# Patient Record
Sex: FEMALE | Race: BLACK OR AFRICAN AMERICAN | NOT HISPANIC OR LATINO | Employment: OTHER | URBAN - METROPOLITAN AREA
[De-identification: names, ages, dates, MRNs, and addresses within clinical notes are randomized per-mention and may not be internally consistent; named-entity substitution may affect disease eponyms.]

---

## 2017-04-10 ENCOUNTER — HISTORICAL (OUTPATIENT)
Dept: ADMINISTRATIVE | Facility: HOSPITAL | Age: 62
End: 2017-04-10

## 2019-02-20 ENCOUNTER — HISTORICAL (OUTPATIENT)
Dept: RADIOLOGY | Facility: HOSPITAL | Age: 64
End: 2019-02-20

## 2019-04-01 ENCOUNTER — HOSPITAL ENCOUNTER (OUTPATIENT)
Dept: MEDSURG UNIT | Facility: HOSPITAL | Age: 64
End: 2019-04-05
Attending: INTERNAL MEDICINE | Admitting: INTERNAL MEDICINE

## 2019-04-01 LAB
ABS NEUT (OLG): 9.56 X10(3)/MCL (ref 2.1–9.2)
ALBUMIN SERPL-MCNC: 3.6 GM/DL (ref 3.4–5)
ALBUMIN/GLOB SERPL: 0.9 RATIO (ref 1.1–2)
ALP SERPL-CCNC: 121 UNIT/L (ref 38–126)
ALT SERPL-CCNC: 16 UNIT/L (ref 12–78)
AMPHET UR QL SCN: ABNORMAL
AMYLASE SERPL-CCNC: 44 UNIT/L (ref 25–115)
ANISOCYTOSIS BLD QL SMEAR: 2
APPEARANCE, UA: CLEAR
APTT PPP: 27.7 SECOND(S) (ref 24.2–33.9)
AST SERPL-CCNC: 10 UNIT/L (ref 15–37)
BACTERIA SPEC CULT: ABNORMAL /HPF
BARBITURATE SCN PRESENT UR: ABNORMAL
BASOPHILS # BLD AUTO: 0 X10(3)/MCL (ref 0–0.2)
BASOPHILS NFR BLD AUTO: 0 %
BENZODIAZ UR QL SCN: ABNORMAL
BILIRUB SERPL-MCNC: 0.3 MG/DL (ref 0.2–1)
BILIRUB UR QL STRIP: NEGATIVE
BILIRUBIN DIRECT+TOT PNL SERPL-MCNC: 0.1 MG/DL (ref 0–0.5)
BILIRUBIN DIRECT+TOT PNL SERPL-MCNC: 0.2 MG/DL (ref 0–0.8)
BUN SERPL-MCNC: 12 MG/DL (ref 7–18)
CALCIUM SERPL-MCNC: 9.3 MG/DL (ref 8.5–10.1)
CANNABINOIDS UR QL SCN: ABNORMAL
CHLORIDE SERPL-SCNC: 105 MMOL/L (ref 98–107)
CK MB SERPL-MCNC: 0.7 NG/ML (ref 0.5–3.6)
CK SERPL-CCNC: 56 UNIT/L (ref 26–192)
CO2 SERPL-SCNC: 30 MMOL/L (ref 21–32)
COCAINE UR QL SCN: ABNORMAL
COLOR UR: YELLOW
CREAT SERPL-MCNC: 0.88 MG/DL (ref 0.55–1.02)
CROSSMATCH INTERPRETATION: NORMAL
EOSINOPHIL # BLD AUTO: 0 X10(3)/MCL (ref 0–0.9)
EOSINOPHIL NFR BLD AUTO: 0 %
ERYTHROCYTE [DISTWIDTH] IN BLOOD BY AUTOMATED COUNT: 29 % (ref 11.5–17)
FERRITIN SERPL-MCNC: 4.2 NG/ML (ref 8–388)
GLOBULIN SER-MCNC: 4.1 GM/DL (ref 2.4–3.5)
GLUCOSE (UA): NEGATIVE
GLUCOSE SERPL-MCNC: 141 MG/DL (ref 74–106)
GROUP & RH: NORMAL
HCT VFR BLD AUTO: 29 % (ref 37–47)
HGB BLD-MCNC: 8.4 GM/DL (ref 12–16)
HGB UR QL STRIP: NEGATIVE
HYPOCHROMIA BLD QL SMEAR: 1
INR PPP: 1 (ref 0–1.3)
IRON SATN MFR SERPL: 4.7 % (ref 20–50)
IRON SERPL-MCNC: 21 MCG/DL (ref 50–175)
KETONES UR QL STRIP: NEGATIVE
LACTATE SERPL-SCNC: 2 MMOL/L (ref 0.4–2)
LEUKOCYTE ESTERASE UR QL STRIP: NEGATIVE
LIPASE SERPL-CCNC: 90 UNIT/L (ref 73–393)
LYMPHOCYTES # BLD AUTO: 1.3 X10(3)/MCL (ref 0.6–4.6)
LYMPHOCYTES NFR BLD AUTO: 12 % (ref 13–40)
MAGNESIUM SERPL-MCNC: 2 MG/DL (ref 1.8–2.4)
MCH RBC QN AUTO: 16.7 PG (ref 27–31)
MCHC RBC AUTO-ENTMCNC: 29 GM/DL (ref 33–36)
MCV RBC AUTO: 57.7 FL (ref 80–94)
MICROCYTES BLD QL SMEAR: 1
MONOCYTES # BLD AUTO: 0.7 X10(3)/MCL (ref 0.1–1.3)
MONOCYTES NFR BLD AUTO: 6 %
NEUTROPHILS # BLD AUTO: 9.56 X10(3)/MCL (ref 2.1–9.2)
NEUTROPHILS NFR BLD AUTO: 82 %
NITRITE UR QL STRIP: NEGATIVE
NRBC BLD AUTO-RTO: 0.2 % (ref 0–0.2)
OPIATES UR QL SCN: ABNORMAL
PCP UR QL: ABNORMAL
PH UR STRIP.AUTO: 8 [PH] (ref 5–7.5)
PH UR STRIP: 8 [PH] (ref 5–9)
PLATELET # BLD AUTO: 411 X10(3)/MCL (ref 130–400)
PLATELET # BLD EST: ABNORMAL 10*3/UL
PMV BLD AUTO: ABNORMAL FL (ref 7.4–10.4)
POIKILOCYTOSIS BLD QL SMEAR: 1
POLYCHROMASIA BLD QL SMEAR: 1
POTASSIUM SERPL-SCNC: 2.8 MMOL/L (ref 3.5–5.1)
PRODUCT READY: NORMAL
PROT SERPL-MCNC: 7.7 GM/DL (ref 6.4–8.2)
PROT UR QL STRIP: ABNORMAL
PROTHROMBIN TIME: 13.3 SECOND(S) (ref 12–14)
RBC # BLD AUTO: 5.03 X10(6)/MCL (ref 4.2–5.4)
RBC #/AREA URNS HPF: ABNORMAL /[HPF]
SCHISTOCYTES BLD QL AUTO: 1
SODIUM SERPL-SCNC: 140 MMOL/L (ref 136–145)
SP GR FLD REFRACTOMETRY: 1.01 (ref 1–1.03)
SP GR UR STRIP: 1.01 (ref 1–1.03)
SQUAMOUS EPITHELIAL, UA: ABNORMAL
TARGETS BLD QL SMEAR: 1
TIBC SERPL-MCNC: 449 MCG/DL (ref 250–450)
TRANSFERRIN SERPL-MCNC: 323 MG/DL (ref 200–360)
TRANSFUSION ORDER: NORMAL
TROPONIN I SERPL-MCNC: <0.02 NG/ML (ref 0.02–0.49)
TSH SERPL-ACNC: 1.53 MIU/L (ref 0.36–3.74)
UROBILINOGEN UR STRIP-ACNC: 0.2
WBC # SPEC AUTO: 11.7 X10(3)/MCL (ref 4.5–11.5)
WBC #/AREA URNS HPF: ABNORMAL /[HPF]

## 2019-04-02 LAB
ABS NEUT (OLG): 8.13 X10(3)/MCL (ref 2.1–9.2)
ABS NEUT (OLG): 8.7 X10(3)/MCL (ref 2.1–9.2)
ANISOCYTOSIS BLD QL SMEAR: 2
BASOPHILS # BLD AUTO: 0 X10(3)/MCL (ref 0–0.2)
BASOPHILS # BLD AUTO: 0 X10(3)/MCL (ref 0–0.2)
BASOPHILS NFR BLD AUTO: 0 %
BASOPHILS NFR BLD AUTO: 0 %
BUN SERPL-MCNC: 12 MG/DL (ref 7–18)
CALCIUM SERPL-MCNC: 8.9 MG/DL (ref 8.5–10.1)
CHLORIDE SERPL-SCNC: 108 MMOL/L (ref 98–107)
CO2 SERPL-SCNC: 28 MMOL/L (ref 21–32)
COLOR STL: ABNORMAL
CONSISTENCY STL: ABNORMAL
CREAT SERPL-MCNC: 0.85 MG/DL (ref 0.55–1.02)
CREAT/UREA NIT SERPL: 14.1
EOSINOPHIL # BLD AUTO: 0 X10(3)/MCL (ref 0–0.9)
EOSINOPHIL # BLD AUTO: 0 X10(3)/MCL (ref 0–0.9)
EOSINOPHIL NFR BLD AUTO: 0 %
EOSINOPHIL NFR BLD AUTO: 0 %
ERYTHROCYTE [DISTWIDTH] IN BLOOD BY AUTOMATED COUNT: 28.6 % (ref 11.5–17)
ERYTHROCYTE [DISTWIDTH] IN BLOOD BY AUTOMATED COUNT: 30.2 % (ref 11.5–17)
GLUCOSE SERPL-MCNC: 108 MG/DL (ref 74–106)
HCT VFR BLD AUTO: 26.6 % (ref 37–47)
HCT VFR BLD AUTO: 27.1 % (ref 37–47)
HEMOCCULT SP1 STL QL: POSITIVE
HGB BLD-MCNC: 7.7 GM/DL (ref 12–16)
HGB BLD-MCNC: 8 GM/DL (ref 12–16)
HYPOCHROMIA BLD QL SMEAR: 1
LYMPHOCYTES # BLD AUTO: 1.3 X10(3)/MCL (ref 0.6–4.6)
LYMPHOCYTES # BLD AUTO: 1.8 X10(3)/MCL (ref 0.6–4.6)
LYMPHOCYTES NFR BLD AUTO: 12 %
LYMPHOCYTES NFR BLD AUTO: 16 %
MAGNESIUM SERPL-MCNC: 1.9 MG/DL (ref 1.8–2.4)
MCH RBC QN AUTO: 16.6 PG (ref 27–31)
MCH RBC QN AUTO: 17.9 PG (ref 27–31)
MCHC RBC AUTO-ENTMCNC: 28.4 GM/DL (ref 33–36)
MCHC RBC AUTO-ENTMCNC: 30.1 GM/DL (ref 33–36)
MCV RBC AUTO: 58.4 FL (ref 80–94)
MCV RBC AUTO: 60.7 FL (ref 80–94)
MICROCYTES BLD QL SMEAR: 2
MONOCYTES # BLD AUTO: 0.7 X10(3)/MCL (ref 0.1–1.3)
MONOCYTES # BLD AUTO: 0.8 X10(3)/MCL (ref 0.1–1.3)
MONOCYTES NFR BLD AUTO: 6 %
MONOCYTES NFR BLD AUTO: 8 %
NEUTROPHILS # BLD AUTO: 8.13 X10(3)/MCL (ref 2.1–9.2)
NEUTROPHILS # BLD AUTO: 8.7 X10(3)/MCL (ref 2.1–9.2)
NEUTROPHILS NFR BLD AUTO: 75 %
NEUTROPHILS NFR BLD AUTO: 81 %
NRBC BLD AUTO-RTO: 0.2 % (ref 0–0.2)
PLATELET # BLD AUTO: 366 X10(3)/MCL (ref 130–400)
PLATELET # BLD AUTO: 409 X10(3)/MCL (ref 130–400)
PLATELET # BLD EST: NORMAL 10*3/UL
PMV BLD AUTO: ABNORMAL FL (ref 7.4–10.4)
PMV BLD AUTO: ABNORMAL FL (ref 7.4–10.4)
POIKILOCYTOSIS BLD QL SMEAR: 2
POTASSIUM SERPL-SCNC: 4.1 MMOL/L (ref 3.5–5.1)
RBC # BLD AUTO: 4.46 X10(6)/MCL (ref 4.2–5.4)
RBC # BLD AUTO: 4.64 X10(6)/MCL (ref 4.2–5.4)
RBC MORPH BLD: ABNORMAL
SODIUM SERPL-SCNC: 141 MMOL/L (ref 136–145)
TARGETS BLD QL SMEAR: 1
WBC # SPEC AUTO: 10.8 X10(3)/MCL (ref 4.5–11.5)
WBC # SPEC AUTO: 10.8 X10(3)/MCL (ref 4.5–11.5)

## 2019-04-03 LAB
ANISOCYTOSIS BLD QL SMEAR: 2
CRC RECOMMENDATION EXT: NORMAL
ERYTHROCYTE [DISTWIDTH] IN BLOOD BY AUTOMATED COUNT: 30.1 % (ref 11.5–17)
HCT VFR BLD AUTO: 25.1 % (ref 37–47)
HGB BLD-MCNC: 7.4 GM/DL (ref 12–16)
HYPOCHROMIA BLD QL SMEAR: 2
MCH RBC QN AUTO: 17.8 PG (ref 27–31)
MCHC RBC AUTO-ENTMCNC: 29.5 GM/DL (ref 33–36)
MCV RBC AUTO: 60.3 FL (ref 80–94)
MICROCYTES BLD QL SMEAR: 2
PLATELET # BLD AUTO: 330 X10(3)/MCL (ref 130–400)
PLATELET # BLD EST: NORMAL 10*3/UL
PMV BLD AUTO: ABNORMAL FL (ref 7.4–10.4)
POIKILOCYTOSIS BLD QL SMEAR: 2
RBC # BLD AUTO: 4.16 X10(6)/MCL (ref 4.2–5.4)
SCHISTOCYTES BLD QL AUTO: 1
TARGETS BLD QL SMEAR: 1
WBC # SPEC AUTO: 7.7 X10(3)/MCL (ref 4.5–11.5)

## 2019-04-04 LAB
ABS NEUT (OLG): 4.44 X10(3)/MCL (ref 2.1–9.2)
BASOPHILS # BLD AUTO: 0 X10(3)/MCL (ref 0–0.2)
BASOPHILS NFR BLD AUTO: 0 %
BUN SERPL-MCNC: 15 MG/DL (ref 7–18)
CALCIUM SERPL-MCNC: 8.4 MG/DL (ref 8.5–10.1)
CHLORIDE SERPL-SCNC: 103 MMOL/L (ref 98–107)
CO2 SERPL-SCNC: 28 MMOL/L (ref 21–32)
CREAT SERPL-MCNC: 0.89 MG/DL (ref 0.55–1.02)
CREAT/UREA NIT SERPL: 16.9
EOSINOPHIL # BLD AUTO: 0.1 X10(3)/MCL (ref 0–0.9)
EOSINOPHIL NFR BLD AUTO: 2 %
ERYTHROCYTE [DISTWIDTH] IN BLOOD BY AUTOMATED COUNT: 30.6 % (ref 11.5–17)
GLUCOSE SERPL-MCNC: 94 MG/DL (ref 74–106)
HCT VFR BLD AUTO: 24.7 % (ref 37–47)
HGB BLD-MCNC: 7.2 GM/DL (ref 12–16)
LYMPHOCYTES # BLD AUTO: 1.5 X10(3)/MCL (ref 0.6–4.6)
LYMPHOCYTES NFR BLD AUTO: 23 %
MCH RBC QN AUTO: 17.6 PG (ref 27–31)
MCHC RBC AUTO-ENTMCNC: 29.1 GM/DL (ref 33–36)
MCV RBC AUTO: 60.2 FL (ref 80–94)
MONOCYTES # BLD AUTO: 0.5 X10(3)/MCL (ref 0.1–1.3)
MONOCYTES NFR BLD AUTO: 8 %
NEUTROPHILS # BLD AUTO: 4.44 X10(3)/MCL (ref 2.1–9.2)
NEUTROPHILS NFR BLD AUTO: 67 %
PLATELET # BLD AUTO: 273 X10(3)/MCL (ref 130–400)
PMV BLD AUTO: ABNORMAL FL (ref 7.4–10.4)
POTASSIUM SERPL-SCNC: 3.5 MMOL/L (ref 3.5–5.1)
RBC # BLD AUTO: 4.1 X10(6)/MCL (ref 4.2–5.4)
SODIUM SERPL-SCNC: 140 MMOL/L (ref 136–145)
WBC # SPEC AUTO: 6.6 X10(3)/MCL (ref 4.5–11.5)

## 2019-04-05 LAB
BUN SERPL-MCNC: 13 MG/DL (ref 7–18)
CALCIUM SERPL-MCNC: 8.9 MG/DL (ref 8.5–10.1)
CHLORIDE SERPL-SCNC: 105 MMOL/L (ref 98–107)
CO2 SERPL-SCNC: 28 MMOL/L (ref 21–32)
CREAT SERPL-MCNC: 0.81 MG/DL (ref 0.55–1.02)
CREAT/UREA NIT SERPL: 16
ERYTHROCYTE [DISTWIDTH] IN BLOOD BY AUTOMATED COUNT: 30.8 % (ref 11.5–17)
GLUCOSE SERPL-MCNC: 90 MG/DL (ref 74–106)
HCT VFR BLD AUTO: 25.7 % (ref 37–47)
HGB BLD-MCNC: 7.5 GM/DL (ref 12–16)
MCH RBC QN AUTO: 17.6 PG (ref 27–31)
MCHC RBC AUTO-ENTMCNC: 29.2 GM/DL (ref 33–36)
MCV RBC AUTO: 60.2 FL (ref 80–94)
PLATELET # BLD AUTO: 339 X10(3)/MCL (ref 130–400)
PMV BLD AUTO: ABNORMAL FL (ref 7.4–10.4)
POTASSIUM SERPL-SCNC: 4.2 MMOL/L (ref 3.5–5.1)
RBC # BLD AUTO: 4.27 X10(6)/MCL (ref 4.2–5.4)
SODIUM SERPL-SCNC: 140 MMOL/L (ref 136–145)
WBC # SPEC AUTO: 6.8 X10(3)/MCL (ref 4.5–11.5)

## 2019-05-31 ENCOUNTER — HISTORICAL (OUTPATIENT)
Dept: INFUSION THERAPY | Facility: HOSPITAL | Age: 64
End: 2019-05-31

## 2019-06-14 ENCOUNTER — HISTORICAL (OUTPATIENT)
Dept: INFUSION THERAPY | Facility: HOSPITAL | Age: 64
End: 2019-06-14

## 2019-07-09 ENCOUNTER — HISTORICAL (OUTPATIENT)
Dept: INFUSION THERAPY | Facility: HOSPITAL | Age: 64
End: 2019-07-09

## 2019-08-09 ENCOUNTER — HISTORICAL (OUTPATIENT)
Dept: INFUSION THERAPY | Facility: HOSPITAL | Age: 64
End: 2019-08-09

## 2019-08-23 ENCOUNTER — HISTORICAL (OUTPATIENT)
Dept: RADIOLOGY | Facility: HOSPITAL | Age: 64
End: 2019-08-23

## 2019-09-12 ENCOUNTER — HISTORICAL (OUTPATIENT)
Dept: INFUSION THERAPY | Facility: HOSPITAL | Age: 64
End: 2019-09-12

## 2019-09-23 ENCOUNTER — HISTORICAL (OUTPATIENT)
Dept: INFUSION THERAPY | Facility: HOSPITAL | Age: 64
End: 2019-09-23

## 2019-10-07 ENCOUNTER — HISTORICAL (OUTPATIENT)
Dept: INFUSION THERAPY | Facility: HOSPITAL | Age: 64
End: 2019-10-07

## 2019-10-21 ENCOUNTER — HISTORICAL (OUTPATIENT)
Dept: INFUSION THERAPY | Facility: HOSPITAL | Age: 64
End: 2019-10-21

## 2021-06-25 ENCOUNTER — HISTORICAL (OUTPATIENT)
Dept: RADIOLOGY | Facility: HOSPITAL | Age: 66
End: 2021-06-25

## 2022-04-30 NOTE — CONSULTS
"   Patient:   Valery Winchester            MRN: [id number]            FIN: [id number]               Age:   63 years     Sex:  Female     :  1955   Associated Diagnoses:   None   Author:   Sharmin Covarrubias      Chief Complaint   We have been consulted by Dr. Raymundo to evaluate this patient for melena, anemia.      History of Present Illness   Ms. Winchester is a 63 year old AAF unknown to our group (known to Dr. Vinson).  She has a past medical history of reflux, retinal disease, PVD, hypertension, and asthma.  She presented to the ED complaining of dizziness that started this past Friday.  CT of the head is unremarkable.  She was hypertensive on arrival to the ED, and blood pressure has improved.    She also reported black stools over the past few days.  She reportedly has a history of iron deficiency anemia and was recently started on oral iron.  Though, she has not started taking it yet.  She denies taking Pepto-Bismol.  She began to have dark stool this past Friday.  At first her stools are hard and difficult to pass and then become soft.  She denies any hematochezia or hematemesis.  She does have some intermittent lower abdominal discomfort that is relieved with BMs.  She takes an 81 mg aspirin at home but otherwise denies anticoagulation.  She uses NSAIDs on occasion, occasionally Advil PM to help her sleep.  She does have a history of reflux and takes a PPI and Zantac at home.  Her reflux is controlled with dietary modifications as well.     Hemoglobin was down to 7.7 g/dL, and she was transfused 1 unit PRBC.  Hemoglobin is now 8 g/dL today.  Iron level noted to be low.  Stool is dark and Hemoccult positive.  She recalls having an EGD with Dr. Vinson about 3 years ago and reportedly had gastric polyps.  A colonoscopy about 3 years ago was "clear;" She wants to think the polyps were in her stomach and not in the colon.      Health Status   Allergies:    Allergic Reactions (All)  Severity Not " Documented  Lisinopril- Angio adema.  Canceled/Inactive Reactions (All)  No Known Allergies,    Allergies (1) Active Reaction  lisinopril angio adema   Current medications:  (Selected)   Inpatient Medications  Ordered  Protonix 40 mg Vial (IV Push): 40 mg, form: Injection, IV Slow, q12hr, Infuse over: 2 minute(s), first dose 19 20:27:00 CDT, STAT  Zofran 2 mg/mL injectable solution: 4 mg, form: Injection, IV Push, q4hr PRN for nausea, first dose 19 17:54:00 CDT, STAT  amLODIPine: 10 mg, form: Tab, Oral, Daily, first dose 19 9:00:00 CDT  cloNIDine: 0.1 mg, form: Tab, Oral, TID PRN for hypertension, first dose 19 21:18:00 CDT, STAT, SBP > 160, DBP > 90  hydrALAZINE (Apresoline) Inj.: 20 mg, form: Injection, IV Push, q4hr PRN for hypertension, first dose 19 20:23:00 CDT, STAT, If systolic blood pressure is greater than 180  labetalol 200 mg oral tablet: 200 mg, form: Tab, Oral, BID, first dose 19 21:00:00 CDT, STAT  labetalol 5 mg/mL intravenous solution: 20 mg, form: Soln, IV Push, q2hr PRN for hypertension, first dose 19 21:18:00 CDT, STAT, SBP > 160, DBP > 90  topiramate: 25 mg, form: Tab, Oral, BID, first dose 19 9:00:00 CDT  venlafaxine 37.5 mg oral capsule, extended release: 225 mg, form: Cap-CR, Oral, Daily, first dose 19 9:00:00 CDT  Prescriptions  Prescribed  BuSpar 5 mg oral tablet: 5 mg = 1 tab(s), Oral, TID, PRN PRN anxiety, # 21 tab(s), 0 Refill(s)  Zofran ODT 4 mg oral tablet, disintegratin mg = 1 tab(s), Oral, TID, PRN PRN as needed for nausea/vomiting, # 6 tab(s), 0 Refill(s)  Documented Medications  Documented  AMLODIPINE BESYLATE 10 MG T: 10 mg = 1 tab(s), Oral, Daily  ATORVASTATIN 20 MG TABLET: 20 mg = 1 tab(s), Oral, Daily  ATORVASTATIN 40 MG TABLET: 40 mg = 1 tab(s), Oral, Daily  Aspir 81 oral delayed release tablet: 81 mg = 1 tab(s), Oral, Every other day, # 30 tab(s), 0 Refill(s)  FOLIC ACID 1 MG TAB: 1 mg = 1 tab(s), Oral,  Daily  GABAPENTIN 300 MG CAPSULE: See Instructions, 1 cap(s) Oral 1 in the morning 1 at noon  LISINOPRIL-HCTZ 20/12.5 TAB:   OMEPRAZOLE DR 40 MG CAPSULE: 40 mg = 1 cap(s), Oral, Daily  Pantoprazole 40 mg ORAL EC-Tablet: 40 mg = 1 tab(s), Oral, Daily, # 90 tab(s), 0 Refill(s)  Poly Iron 150 Forte: 1 tab, Oral, Daily, 0 Refill(s)  TOPIRAMATE 25 MG TABLET: 25 mg = 1 tab(s), Oral, BID  VENLAFAXINE HCL ER 75 MG CA: 225 mg = 3 cap(s), Oral, Daily  VENTOLIN HFA 90 MCG INHALER:   gabapentin 300 mg oral capsule: 300 mg = 1 cap(s), Oral, At Bedtime, 0 Refill(s)  magnesium gluconate 250 mg oral tablet: 250 mg = 1 tab(s), Oral, Daily, # 30 tab(s), 0 Refill(s)  ondansetron 4 mg oral tablet: 4 mg = 1 tab(s), Oral, q8hr, PRN PRN as needed for nausea/vomiting, 0 Refill(s)  ranitidine 300 mg oral tablet: 300 mg = 1 tab(s), Oral, Daily, 0 Refill(s)      Histories   Past Medical History:    Active  Retinal disease (41519157)  PVD (peripheral vascular disease) ([id number])  HTN (hypertension) ([id number])  Acid reflux ([id number])  Claustrophobia ([id number])  Anxiety ([id number])  Unable to lie down ([id number])  Exercise intolerance ([id number])  Asthma ([id number])   Procedure history:    75577 - LT REM LENS MATERIAL  PPV (Left) on 4/10/2017 at 61 Years.  Comments:  4/14/2017 20:48 - Henrry MOJICA, Char  auto-populated from documented surgical case  Colonoscopy ([id number]).  Esophagogastroduodenoscopy ([id number]).  Cataract OU.  Hysterectomy (971409241).  Tonsillectomy (302034383).      SOCIAL HX: She denies tobacco or alcohol use.    FAMILY HX: Her father had cirrhosis secondary to alcohol.  No known family history of colon polyps or colon cancer.      Review of Systems   Constitutional:  No fever, No chills, No sweats, No weakness.    Respiratory:  No shortness of breath, No  cough, No hemoptysis, No wheezing.    Cardiovascular:  No chest pain, No palpitations, No peripheral edema, No syncope.    Gastrointestinal:  See HPI .    Musculoskeletal:  No back pain, No muscle pain, No decreased range of motion.    Integumentary:  No rash, No pruritus, No skin lesion.    Neurologic:  No confusion, No numbness, No tingling.    All other systems are negative      Physical Examination   General:  Alert and oriented, No acute distress.       Vital Signs (last 24 hrs)_____  Last Charted___________  Temp Oral     37.1 DegC  (APR 02 07:28)  Heart Rate Peripheral   H 109bpm  (APR 02 07:28)  Resp Rate         16   (APR 02 04:57)  SBP      H 146mmHg  (APR 02 07:28)  DBP      88 mmHg  (APR 02 07:28)  SpO2      94 %  (APR 02 07:28)   Eye:  Extraocular movements are intact, Sclerae are nonicteric.    HENT:  Normocephalic.    Respiratory:  Respirations are non-labored.    Cardiovascular:  Normal rate, Regular rhythm, No edema.    Gastrointestinal:  Soft, Non-tender, Non-distended, Normal bowel sounds, increased adiposity.    Musculoskeletal:  Normal range of motion.    Integumentary:  Warm, Dry, Pink, Intact.    Neurologic:  Alert, Oriented, No focal deficits.    Psychiatric:  Cooperative, Appropriate mood & affect.       Review / Management   Results review:  All Results   4/2/2019 7:30 CDT        Color Stl                 Black                             Consist Stl               Soft                             Occult Bld Stl            Positive    4/2/2019 6:08 CDT        WBC                       10.8 x10(3)/mcL                             RBC                       4.46 x10(6)/mcL                             Hgb                       8.0 gm/dL  LOW                             Hct                       26.6 %  LOW                             Platelet                  366 x10(3)/mcL                             MCV                       60.7 fL  LOW                             MCH                        17.9 pg  LOW                             MCHC                      30.1 gm/dL  LOW                             RDW                       30.2 %  HI                             MPV                       n/a fL                             Abs Neut                  8.13 x10(3)/mcL                             Neutro Auto               75 %  NA                             Lymph Auto                16 %  NA                             Mono Auto                 8 %  NA                             Eos Auto                  0 %  NA                             Abs Eos                   0.0 x10(3)/mcL                             Basophil Auto             0 %  NA                             Abs Neutro                8.13 x10(3)/mcL                             Abs Lymph                 1.8 x10(3)/mcL                             Abs Mono                  0.8 x10(3)/mcL                             Abs Baso                  0.0 x10(3)/mcL                             NRBC%                     0.2 %                             Hypochrom                 1  HI                             Platelet Est              Normal                             Anisocyte                 2  HI                             Poik                      2  HI                             Microcyte                 2  HI                             RBC Morph                 Abnormal                             Target Cell               1  HI                             Sodium Lvl                141 mmol/L                             Potassium Lvl             4.1 mmol/L                             Chloride                  108 mmol/L  HI                             CO2                       28.0 mmol/L                             Calcium Lvl               8.9 mg/dL                             Magnesium Lvl             1.9 mg/dL                             Glucose Lvl               108 mg/dL  HI                             BUN                       12.0 mg/dL                              Creatinine                0.85 mg/dL                             BUN/Creat Ratio           14.1  NA                             eGFR-AA                   >60 mL/min/1.73 m2  NA                             eGFR-MARYELLEN                  >60 mL/min/1.73 m2  NA    4/2/2019 0:19 CDT        Hgb                       7.7 gm/dL  LOW                             Hct                       27.1 %  LOW    4/1/2019 18:18 CDT       Hgb                       8.4 gm/dL  LOW                             Hct                       29.0 %  LOW                             PT                        13.3 second(s)                             INR                       1.0                             PTT                       27.7 second(s)                             Amylase Lvl               44 unit/L                             Bili Total                0.3 mg/dL                             Bili Direct               0.10 mg/dL                             Bili Indirect             0.20 mg/dL                             AST                       10 unit/L  LOW                             ALT                       16 unit/L                             Alk Phos                  121 unit/L                             Total Protein             7.7 gm/dL                             Albumin Lvl               3.60 gm/dL                             Globulin                  4.10 gm/dL  HI                             A/G Ratio                 0.9 ratio  LOW                             Iron Lvl                  21 mcg/dL  LOW                             Transferrin               323.0 mg/dL                             TIBC                      449 mcg/dL                             Iron Sat                  4.7 %  LOW                             Ferritin Lvl              4.2 ng/mL  LOW                             Lactic Acid Lvl           2.0 mmol/L                             Lipase Lvl                90 unit/L                              Total CK                  56 unit/L                             CK MB                     0.7 ng/mL                             Troponin-I                <0.02 ng/mL                             TSH                       1.530 mIU/L    4/1/2019 17:53 CDT       UA Appear                 CLEAR                             UA Color                  YELLOW                             UA Spec Grav              1.015                             UA Bili                   Negative                             UA pH                     8.0                             UA Urobilinogen           0.2                             UA Blood                  Negative                             UA Glucose                Negative                             UA Ketones                Negative                             UA Protein                1+                             UA Nitrite                Negative                             UA Leuk Est               Negative                             UA WBC                    NONE SEEN                             UA RBC                    NONE SEEN                             UA Bacteria               NONE SEEN /HPF                             UA Squam Epithelial       NONE SEEN                             U pH                      8.0  HI                             U Spec Grav               1.015                             U Amph Scr                NEG                             U Abbey Scr                NEG                             U Benzodia Scr            NEG                             U Cannab Scr              NEG                             U Cocaine Scr             NEG                             U Opiate Scr              NEG                             U Phencyclidine Scr       NEG  .    Radiology results   Rad Results (ST)   Accession: [id number]  Order: US Renal Arteries Doppler  Report Dt/Tm: 04/02/2019 08:38  Report:   EXAMINATION  US Renal Arteries  Doppler     TECHNIQUE  Multiplanar grayscale sonographic evaluation of the kidneys, as well  as color/spectral Doppler interrogation of the aorta and renal  vasculature.     INDICATION  HTN, hypokalemia;Hypertension     Comparison: There are no similar comparisons.     FINDINGS  The renal cortex is unremarkable in appearance bilaterally.  No evidence of solid parenchymal lesion, obstructive uropathy, or  perinephric fluid is identified. A circumscribed simple-appearing cyst  is noted at the right lower renal pole cortex, measuring up to 1.9 cm  in diameter.     Measurements:  Right kidney - 9.8 cm x 6 cm x 5.2 cm  Left kidney - 10.3 cm x 7.3 cm x 5.1 cm     ==============  Doppler evaluation:  The abdominal aorta is without focal dilatation or evidence for  intraluminal abnormality. The velocities and spectral waveforms are  unremarkable.     The interrogated bilateral renal vasculature demonstrates normal  waveforms and velocities. There is no focal renal artery velocity  elevation to suggest high-grade stenosis.  The bilateral intrarenal resistive indices are within normal limits  (less than 0.7).  The right renal artery/aortic ratio (RAR) is 0.6; left RAR is 0.7.  Intact venous outflow is demonstrated bilaterally.     IMPRESSION  1.  No sonographic evidence of renal artery stenosis.  2.  Simple appearing right lower pole cyst. Otherwise, unremarkable  grayscale appearance of the kidneys.        * Final Report *    Reason For Exam  Dizziness , vertigo    Radiology Report     Clinical History:  Dizziness     Technique:  Axial CT of the brain were obtained without contrast. There are  osseous reconstructed images available for review with coronal and  sagittal reconstructions.     Automatic exposure control was utilized to reduce the patient's  radiation dose.     Comparison:  No prior imaging available for comparison.     Findings:  No acute intracranial hemorrhage.  Diffuse cerebral atrophy with concordant  ventricular enlargement.   There is normal gray white differentiation.   The osseous structures are normal.   The mastoid air cells are clear.   Debris within the right auditory canal.  The globes and orbital contents are normal bilaterally.  The visualized maxillary, ethmoid and sphenoid sinuses are clear.     Impression  No acute intracranial hemorrhage. Findings of chronic microvascular  ischemic disease.         Impression and Plan    63 year old AAF unknown to our group (known to Dr. Vinson).  She has a past medical history of reflux, retinal disease, PVD, hypertension, and asthma.  She presented to the ED complaining of dizziness that started this past Friday.  CT of the head is unremarkable.  She also reported recent black stools.    1.  Melena, hemoccult positive stool x1  2.  Microcytic anemia  Hgb 8.4 -- 7.7 -- 1u prbc -- 8 g/dL  Iron deficiency noted; she was recently prescribed oral iron outpatient but did not start it.  She takes 81 mg asa daily and uses NSAIDs on occasion.  -Monitor H/H, transfuse if needed  -Monitor stools  -Continue PPI BID for now  -EGD today, r/o upper GI source of blood loss. Keep npo.      Professional Services   Thank you for this consult, please call if you have any questions or concerns.

## 2022-04-30 NOTE — ED PROVIDER NOTES
Patient:   Valery Winchester            MRN: [id number]            FIN: [id number]               Age:   63 years     Sex:  Female     :  1955   Associated Diagnoses:   Abdominal pain; Symptomatic anemia; Anxiety; Hypertension; GI bleeding; Melanotic stools; Hypokalemia   Author:   Faustino White MD      Basic Information   Time seen: Date & time 2019 17:37:00.   History source: Patient.   Arrival mode: Ambulance.   History limitation: None.   Additional information: Patient's physician(s): Mallory Chavez NP, Chief Complaint from Nursing Triage Note : Chief Complaint   2019 16:16 CDT       Chief Complaint           abd pain since Fri, dizziness, nausea, no emesis, black stools  , I have assummed care of this patient at   1737. DWMMD.      History of Present Illness   The patient presents with dizziness, vertigo, Nausea since Friday dizziness since last night dark tarry stool weak feeling extreme anxiety and   62 y/o black female with hx of anxiety, HTN, anemia, HLD, and gastritis presents to the ED complaining of dizziness that began last night (3.31.19). She states that it feels as thought the room is spinning and her dizziness worsens with movement. Pt reports nausea since Friday (3.29.19) and melena, however, denies any vomiting, fever, dysuria, or previous experience with these symptoms. Pt states that she took all medication today and has a scheduled appointment with her PCP for (4.8.19)..  The onset was 3.31.19.  The course/duration of symptoms is constant.  The character of symptoms is spinning.  The degree at present is moderate.  The exacerbating factor is movement.  The relieving factor is none.  Risk factors consist of hypertension.  Prior episodes: none.  Therapy today: none.  Associated symptoms: nausea.  Additional history: none.        Review of Systems   Constitutional symptoms:  No fever   Skin symptoms:  Negative except as documented in HPI   Eye symptoms:  Negative  except as documented in HPI   ENMT symptoms:  Negative except as documented in HPI.   Respiratory symptoms:  Negative except as documented in HPI   Cardiovascular symptoms:  Negative except as documented in HPI   Gastrointestinal symptoms:  Nausea, No vomiting,    Genitourinary symptoms:  No dysuria,    Musculoskeletal symptoms:  Negative except as documented in HPI.   Neurologic symptoms:  Dizziness.   Psychiatric symptoms:  Negative except as documented in HPI   Endocrine symptoms:  Negative except as documented in HPI.   Hematologic/Lymphatic symptoms:  Negative except as documented in HPI   Allergy/immunologic symptoms:  Negative except as documented in HPI             Additional review of systems information: All other systems reviewed and otherwise negative.      Health Status   Allergies:    Allergic Reactions (Selected)  Severity Not Documented  Lisinopril- Angio adema..   Medications:  (Selected)   Prescriptions  Prescribed  BuSpar 5 mg oral tablet: 5 mg = 1 tab(s), Oral, TID, PRN PRN anxiety, # 21 tab(s), 0 Refill(s)  Zofran ODT 4 mg oral tablet, disintegratin mg = 1 tab(s), Oral, TID, PRN PRN as needed for nausea/vomiting, # 6 tab(s), 0 Refill(s)  Documented Medications  Documented  AMLODIPINE BESYLATE 10 MG T: 10 mg = 1 tab(s), Oral, Daily  ATORVASTATIN 20 MG TABLET: 20 mg = 1 tab(s), Oral, Daily  ATORVASTATIN 40 MG TABLET:   Aspir 81 oral delayed release tablet: 81 mg = 1 tab(s), Oral, Every other day, # 30 tab(s), 0 Refill(s)  FOLIC ACID 1 MG TAB: 1 mg = 1 tab(s), Oral, Daily  GABAPENTIN 300 MG CAPSULE:   LISINOPRIL-HCTZ 20/12.5 TAB:   OMEPRAZOLE DR 40 MG CAPSULE: 40 mg = 1 cap(s), Oral, Daily  Pantoprazole 40 mg ORAL EC-Tablet: 40 mg = 1 tab(s), Oral, Daily, # 90 tab(s), 0 Refill(s)  Poly Iron 150 Forte: 0 Refill(s)  TOPIRAMATE 25 MG TABLET: 25 mg = 1 tab(s), Oral, BID  VENLAFAXINE HCL ER 75 MG CA:   VENTOLIN HFA 90 MCG INHALER:   magnesium gluconate 250 mg oral tablet: 250 mg = 1 tab(s), Oral,  Daily, # 30 tab(s), 0 Refill(s)  ranitidine 300 mg oral tablet: Daily, 0 Refill(s).   Immunizations: Flu and pneumonia not UTD, no tetanus up to date.      Past Medical/ Family/ Social History   Medical history:    Active  Retinal disease ([id number])  PVD (peripheral vascular disease) ([id number])  HTN (hypertension) ([id number])  Acid reflux ([id number])  Claustrophobia ([id number])  Anxiety ([id number])  Unable to lie down ([id number])  Exercise intolerance ([id number])  Asthma ([id number]).   Surgical history:    22839 - LT REM LENS MATERIAL  PPV (Left) on 4/10/2017 at 61 Years.  Comments:  4/14/2017 20:48 - Henrry MOJICA, Char  auto-populated from documented surgical case  Colonoscopy ([id number]).  Esophagogastroduodenoscopy ([id number]).  Cataract OU.  Hysterectomy (800042356).  Tonsillectomy ([id number])..   Family history: Mother- HTN and heart problems  Father- cirrhosis of the liver.   Social history: Alcohol use: Denies, Tobacco use: Denies, Drug use: Denies, Occupation: Retired, Family/social situation: , lives with relative(s) (grandson).      Physical Examination               Vital Signs   Vital Signs   4/1/2019 17:00 CDT       Peripheral Pulse Rate     93 bpm                             Heart Rate Monitored      94 bpm                             Respiratory Rate          12 br/min                             SpO2                      99 %                             Systolic Blood Pressure   196 mmHg  HI                             Diastolic Blood Pressure  114 mmHg  HI                             Mean Arterial Pressure, Cuff              141 mmHg    4/1/2019 16:38 CDT       Peripheral Pulse Rate     98 bpm                             Heart Rate Monitored      97 bpm                             Respiratory Rate          22 br/min                              Respiratory Rate          15 br/min                             SpO2                      100 %                             SpO2                      99 %                             Systolic Blood Pressure   183 mmHg  HI                             Diastolic Blood Pressure  109 mmHg  HI                             Mean Arterial Pressure, Cuff              134 mmHg    4/1/2019 16:16 CDT       Temperature Temporal Artery               37.0 DegC                             Peripheral Pulse Rate     92 bpm                             Respiratory Rate          18 br/min                             SpO2                      99 %                             Systolic Blood Pressure   228 mmHg  HI                             Diastolic Blood Pressure  104 mmHg  HI  .   Measurements   4/1/2019 16:16 CDT       Weight Dosing             91 kg                             Weight Measured and Calculated in Lbs     200.62 lb                             Weight Estimated          91 kg                             Height/Length Dosing      158 cm                             Height/Length Estimated   158 cm                             Body Mass Index Estimated 36.45 kg/m2  .   Basic Oxygen Information   4/1/2019 17:00 CDT       SpO2                      99 %    4/1/2019 16:38 CDT       SpO2                      100 %                             SpO2                      99 %    4/1/2019 16:16 CDT       SpO2                      99 %  .   General:  Alert, no acute distress, black female, anxious, elevated blood pressure noted, Not ill-appearing,    Skin:  Warm, dry, no rash, normal for ethnicity.    Head:  Normocephalic, atraumatic.    Neck:  Supple, trachea midline, no tenderness, no JVD, no carotid bruit.    Eye:  Pupils are equal, round and reactive to light, extraocular movements are intact   Ears, nose, mouth and throat:  Tympanic membranes clear, oral mucosa moist, no pharyngeal erythema or exudate.     Cardiovascular:  Regular rate and rhythm, No murmur, Normal peripheral perfusion, No edema.    Respiratory:  Lungs are clear to auscultation, respirations are non-labored, breath sounds are equal.    Chest wall:  No tenderness.   Back:  Nontender, Normal range of motion, Normal alignment, no step-offs.    Musculoskeletal:  Normal ROM, normal strength, no tenderness, no swelling.    Gastrointestinal:  Soft, Nontender, Non distended, Normal bowel sounds, No organomegaly, abdomen benign, Rectal exam: Guaiac (positive,  OK), no mass, dark stool.    Genitourinary:  no CVA tenderness.   Neurological:  Alert and oriented to person, place, time, and situation, No focal neurological deficit observed, CN II-XII intact, normal sensory observed, normal motor observed, normal speech observed, normal coordination observed.    Lymphatics:  No lymphadenopathy.   Psychiatric:  Cooperative, appropriate mood & affect, normal judgment.       Medical Decision Making   Differential Diagnosis:  Dizziness, vertigo, GI bleed, anemia.    Documents reviewed:  Emergency department nurses' notes.   Orders  Launch Orders   Laboratory:  Urine Drug Screen (Order): Stat collect, Urine, 4/1/2019 17:53 CDT, Once, Stop date 4/1/2019 17:53 CDT, Nurse collect, Print Label By Order Location, 4/1/2019 17:53 CDT  UA Total a reflex to culture (Order): Stat collect, Urine, 4/1/2019 17:53 CDT, Once, Stop date 4/1/2019 17:53 CDT, Nurse collect, Print Label By Order Location  TSH (Order): Stat collect, 4/1/2019 17:53 CDT, Blood, Once, Stop date 4/1/2019 17:53 CDT, Lab Collect, Print Label By Order Location, 4/1/2019 17:53 CDT  Troponin-I (Order): Stat collect, 4/1/2019 17:53 CDT, Blood, Lab Collect, Print Label By Order Location, 4/1/2019 17:53 CDT  PTT (Order): Stat collect, 4/1/2019 17:52 CDT, Blood, Once, Stop date 4/1/2019 17:52 CDT, Lab Collect, Print Label By Order Location, 4/1/2019 17:52 CDT  INR - Protime (Order): Stat collect,  4/1/2019 17:52 CDT, Blood, Once, Stop date 4/1/2019 17:52 CDT, Lab Collect, Print Label By Order Location, 4/1/2019 17:52 CDT  Lipase Level (Order): Stat collect, 4/1/2019 17:52 CDT, Blood, Once, Stop date 4/1/2019 17:52 CDT, Lab Collect, Print Label By Order Location, 4/1/2019 17:52 CDT  Lactic Acid (Order): Stat collect, 4/1/2019 17:52 CDT, Blood, Lab Collect, Print Label By Order Location, 4/1/2019 17:52 CDT  Type and Ab Screen (Order): 4/1/2019 17:51 CDT, Stat collect, Blood, Lab Collect, Packed RBC, To Keep Ahead, 0, 4/1/2019, Print Label By Order Location  Type and Rh (Order Processing)  Antibody Screen for transfusion  (Indirect Magan) (Order Processing)  Amylase Level (Order): Stat collect, 4/1/2019 17:51 CDT, Blood, Once, Stop date 4/1/2019 17:51 CDT, Lab Collect, Print Label By Order Location, 4/1/2019 17:51 CDT  CMP (Order): Stat collect, 4/1/2019 17:51 CDT, Blood, Lab Collect, Print Label By Order Location, 4/1/2019 17:51 CDT  CK MB (Order): Stat collect, 4/1/2019 17:51 CDT, Blood, Lab Collect, Print Label By Order Location, 4/1/2019 17:51 CDT  CK (Order): Stat collect, 4/1/2019 17:51 CDT, Blood, Once, Stop date 4/1/2019 17:51 CDT, Lab Collect, Print Label By Order Location, 4/1/2019 17:51 CDT  CBC w/ Auto Diff (Order): Now collect, 4/1/2019 17:51 CDT, Blood, Lab Collect, Print Label By Order Location, 4/1/2019 17:51 CDT  Patient Care:  Cardiac Monitoring (Order): 4/1/2019 17:51 CDT, Constant Order  Pharmacy:  Protonix 40 mg Vial (IV Push) (Order): 40 mg, IV Slow, Once, Infuse over: 2 minute(s), first dose 4/1/2019 17:52 CDT, stop date 4/1/2019 17:52 CDT, STAT  Radiology:  XR Chest 1 View (Order): Stat, 4/1/2019 17:53 CDT, Cough, None, Stretcher, Patient Has IV?, Rad Type, Not Scheduled  CT Brain Cranium W/O Contrast (Order): Stat, 4/1/2019 17:53 CDT, Dizziness , vertigo, Fall Risk, Stretcher, Patient Has IV?, Rad Type, Schedule this test  Cardiology:  EKG (Order): 4/1/2019 17:52 CDT, Stat, Stretcher,  Patient Has IV, Standard Precautions, -1, -1, 4/1/2019 17:52 CDT  Respiratory Therapy:  Oxygen Therapy (Order): 4/1/2019 17:52 CDT, 2, Nasal Cannula, CM Oxygen, Launch Orders   Pharmacy:  Zofran 2 mg/mL injectable solution (Order): 4 mg, form: Injection, IV Push, q4hr PRN for nausea, first dose 4/1/2019 17:54 CDT, STAT  Antivert (Order): 50 mg, Oral, Once, first dose 4/1/2019 17:54 CDT, stop date 4/1/2019 17:54 CDT, STAT  Ativan (Order): 0.5 mg, IV Push, Once, first dose 4/1/2019 17:54 CDT, stop date 4/1/2019 17:54 CDT, STAT.    Electrocardiogram:  Time 4/1/2019 18:02:00, rate 86, normal sinus rhythm, No ST-T changes, normal NE & QRS intervals, EP Interp, Ectopy Premature ventricular contractions (occasional).    Results review:  Lab results : Lab View   4/1/2019 18:18 CDT       Sodium Lvl                140 mmol/L                             Potassium Lvl             2.8 mmol/L  LOW                             Chloride                  105 mmol/L                             CO2                       30.0 mmol/L                             Calcium Lvl               9.3 mg/dL                             Glucose Lvl               141 mg/dL  HI                             BUN                       12.0 mg/dL                             Amylase Lvl               44 unit/L                             Albumin Lvl               3.60 gm/dL                             Lactic Acid Lvl           2.0 mmol/L                             Lipase Lvl                90 unit/L                             Total CK                  56 unit/L                             CK MB                     0.7 ng/mL                             TSH                       1.530 mIU/L                             PT                        13.3 second(s)                             INR                       1.0                             PTT                       27.7 second(s)                             WBC                       11.7 x10(3)/mcL  HI                              RBC                       5.03 x10(6)/mcL                             Hgb                       8.4 gm/dL  LOW                             Hct                       29.0 %  LOW                             Platelet                  411 x10(3)/mcL  HI                             MCV                       57.7 fL  LOW                             MCH                       16.7 pg  LOW                             MCHC                      29.0 gm/dL  LOW                             RDW                       29.0 %  HI                             MPV                       n/a fL                             Abs Neut                  9.56 x10(3)/mcL  HI                             Neutro Auto               82 %  NA                             Lymph Auto                12 %  NA                             Mono Auto                 6 %  NA                             Eos Auto                  0 %  NA                             Abs Eos                   0.0 x10(3)/mcL                             Basophil Auto             0 %  NA                             Abs Neutro                9.56 x10(3)/mcL  HI                             Abs Lymph                 1.3 x10(3)/mcL                             Abs Mono                  0.7 x10(3)/mcL                             Abs Baso                  0.0 x10(3)/mcL                             NRBC%                     0.2 %    4/1/2019 17:53 CDT       UA Appear                 CLEAR                             UA Color                  YELLOW                             UA Spec Grav              1.015                             UA Bili                   Negative                             UA pH                     8.0                             UA Urobilinogen           0.2                             UA Blood                  Negative                             UA Glucose                Negative                             UA Ketones                 Negative                             UA Protein                1+                             UA Nitrite                Negative                             UA Leuk Est               Negative                             UA WBC                    NONE SEEN                             UA RBC                    NONE SEEN                             UA Bacteria               NONE SEEN /HPF                             UA Squam Epithelial       NONE SEEN  .   Head Computed Tomography:  No acute disease process, no intracranial hemorrhage, no midline shift, no mass effect, no skull deformity or fracture.    Radiology results:  Reviewed radiologist's report, Rad Results (ST)  < 12 hrs   Accession: [id number]  Order: XR Chest 1 View  Report Dt/Tm: 04/01/2019 18:12  Report:   Clinical History  Cough     Technique  Portable Single view AP radiograph of the chest.     Comparison  February 17, 2019     Findings  No focal opacification, pleural effusion, or pneumothorax. The  cardiomediastinal silhouette is within normal limits. No acute osseous  abnormality.     IMPRESSION:  No acute cardiopulmonary process.     .       Reexamination/ Reevaluation   Time: 4/1/2019 20:17:00 .   Interventions: GI bleed with anemia and symptoms.  Blood count is dropped 1.2 from 6 weeks ago.  Potassium is very low consultation will be obtained with hospitalist and patient will be admitted transfusion of 2 units ordered, Patient says her dizziness is actually better.  Consultations been obtained with the hospitalist service and patient is admitted.      Procedure   Critical care note   Total time: 68 minutes spent engaged in work directly related to patient care and/ or available for direct patient care.   Critical condition(s) addressed for impending deterioration include: central nervous system, metabolic.   Associated risk factors: not bleeding, metabolic changes, hypertension.   Management: bedside assessment, supervision of care, Interventions  (hemodynamic management, Blood transfusion symptomatic anemia, htn), Case review (medical specialist, nursing), Alternate history emergency medical services.   Performed by: self.      Impression and Plan   Diagnosis   Abdominal pain (PNED [id number])   Hypokalemia (DQW77-DZ E87.6)   Symptomatic anemia (VGT31-ZB D64.9)   Anxiety (XDP83-UQ F41.9)   Hypertension (OUO07-MK I10)   GI bleeding (MDT08-KX K92.2)   Melanotic stools (ORJ30-OM K92.1)      Calls-Consults   -  4/1/2019 20:20:00 , Zackary BE, Melissa Garcia.    Plan   Condition: Improved.    Disposition: Admit time  4/1/2019 20:22:00, Admit to Inpatient Unit.    Counseled: Patient, Regarding diagnosis, Regarding diagnostic results, Regarding treatment plan, Patient indicated understanding of instructions.    Notes: I, Patience Sánchez, acted solely as a scribe for and in the presence of Dr. White who performed the service. , I, Faustino White MD, a physician licensed to practice in this state, have  performed the physical evaluation,  history gathering,  and medical decision making that is reflected in this record..   HENNY White MD.

## 2022-04-30 NOTE — DISCHARGE SUMMARY
"   Patient:   Valery Winchester            MRN: [id number]            FIN: [id number]               Age:   63 years     Sex:  Female     :  1955   Associated Diagnoses:   None   Author:   Ezequiel Fry MD      Admit Date: 2019  Discharge Date: 2019    PHYSICIANS  Attending Physician - ER, Physician  Attending Physician - Zackary BE, Dea PARR  Attending Physician - Lisandra BE, Ezequiel  Admitting Physician - Zackary BE, Dea PARR  Consulting Physician - Juan BE, Kasi MULLEN  Primary Care Physician - Kathy BERRY , Mallory    DISCHARGE DIAGNOSIS  GI bleed evidenced by melena  Acute symptomatic, on chronic iron deficiency, anemia secondary to GI blood loss  Hypertensive urgency  Hypokalemia  Anxiety disorder, unspecified (F41.9)     PROCEDURES  2019 - [id number] - Esophagogastroduodenoscopy  2019 - [id number] - M2A Capsule Endoscopy  2019 -  - Colonoscopy    HOSPITAL COURSE  63-year-old female with a history of uncontrolled hypertension, asthma, migraines, GERD, and iron deficiency anemia presented to ED with chief complaint of of dizziness x 3-4 days.  Patient report she was in her usual state of health until about 2 weeks ago when she started having "constipation." She visited her PCP who obtained a blood workup showing anemia for which she started on iron tablets.  In the past 2 days, she started feeling dizzy described as the room spinning associated with nausea and abdominal pain.  She did endorse at least 1 episode of dark black stool.  Reported having EGD & colonoscopy 3 years ago and 2 polyps were removed and plan for repeat colonoscopy in 5 years.  On arrival to ED, She was hypertensive /104, otherwise stable vitals. Labs notable for Hb 8.4 (last Hb 9.6 in 2019). She was admited to hospitalist medicine service for further evaluation and management.  GI was consulted.  She underwent EGD which was negative for any source of bleeding.   She then underwent " colonoscopy noting internal hemorrhoids, diverticulosis, though no evidence of bleeding source.  She underwent CT of the abdomen pelvis yesterday with and without IV contrast, noting equivocal focus of active GI bleeding in the mid small bowel loops of the left lower quadrant.  However M2A capsule showed a few small erosions in the terminal ileum, but otherwise no gross blood in the entire small bowel.  GI recommended restarting ASA and continuing with conservative approach at this point.  She did initially require 1 unit PRBCs following admission with H&H rising up from 7.7/27 up to 8/26. It trended down slighly, but stabilzed ~7.5/26.  She was started on oral iron replacement, which she will continue.  She can follow up with PCP and GI for further monitoring and manamgent.    STATUS  Improved    DISPOSITION  Discharge to home    DIET  Soft    ACTIVITY  As tolerated    IMMUNIZATIONS  No immunizations recorded for this visit.    FOLLOW-UP  Kasi Martinez, within 1 days, on   [facility] WILL CALL YOU WITH YOUR APPOINTMENT DATE AND TIME. PLEASE EXPECT A CALL FROM [phone].  Mallory Chavez NP, on 04/10/2019   Call for followup appointment    DISCHARGE MEDICATION RECONCILIATION  Prescribed  ferrous sulfate (ferrous sulfate 325 mg (65 mg elemental iron) oral delayed release tablet) 325 mg, Oral, TID  ondansetron (ondansetron 4 mg oral tablet) 4 mg, Oral, q8hr, PRN as needed for nausea/vomiting  Continue  albuterol (VENTOLIN HFA 90 MCG INHALER)  amlodipine (AMLODIPINE BESYLATE 10 MG T) 10 mg, Oral, Daily  aspirin (Aspir 81 oral delayed release tablet) 81 mg, Oral, Every other day  atorvastatin (ATORVASTATIN 40 MG TABLET) 40 mg, Oral, Daily  buspirone (BuSpar 5 mg oral tablet) 5 mg, Oral, TID, PRN anxiety  folic acid (FOLIC ACID 1 MG TAB) 1 mg, Oral, Daily  gabapentin (GABAPENTIN 300 MG CAPSULE) See Instructions  gabapentin (gabapentin 300 mg oral capsule) 300 mg, Oral, At Bedtime  magnesium gluconate (magnesium  gluconate 250 mg oral tablet) 250 mg, Oral, Daily  multivitamin with iron (Poly Iron 150 Forte) 1 tab, Oral, Daily  omeprazole (OMEPRAZOLE DR 40 MG CAPSULE) 40 mg, Oral, Daily  pantoprazole (Pantoprazole 40 mg ORAL EC-Tablet) 40 mg, Oral, Daily  ranitidine (ranitidine 300 mg oral tablet) 300 mg, Oral, Daily  topiramate (TOPIRAMATE 25 MG TABLET) 25 mg, Oral, BID  venlafaxine (VENLAFAXINE HCL ER 75 MG CA) 225 mg, Oral, Daily  Discontinue  atorvastatin (ATORVASTATIN 20 MG TABLET) 20 mg, Oral, Daily  hydrochlorothiazide-lisinopril (LISINOPRIL-HCTZ 20/12.5 TAB)  ondansetron (Zofran ODT 4 mg oral tablet, disintegrating) 4 mg, Oral, TID, PRN as needed for nausea/vomiting    PHYSICAL EXAM  AFVSS  GENERAL: awake and in no acute distress  LUNGS: CTA anteriorly  CVS: Normal rate  ABD: Soft, non-tender present, bowel sounds positive  EXTREMITIES: peripheral pulses 2+  NEURO: AAOx3  PSYCHIATRIC: Cooperative      Time spent on discharge 35 minutes

## 2022-05-03 NOTE — HISTORICAL [facility]
This is a historical note converted from [place]. Formatting and pictures may have been removed.  Please reference [place] for original formatting and attached multimedia. Chief Complaint  Abdominal pain,?dizziness?and nausea?for 2 days  History of Present Illness  This is a 63-year-old female?with medical history of?uncontrolled hypertension,?asthma,?migraine,?GERD, and iron?deficiency anemia?present?with chief complaint of?of dizziness?that started?on Friday.? Patient?report?she was in her usual state of health?until about?2 weeks ago?when she started having constipation. ?She visited her PCP?who obtained a blood workup?showing anemia?for which she started on iron tablets. ?In the past 2 days, she started feeling dizzy described as the room spinning?associated with nausea?and?abdominal pain. ?Report her stool pain?hard and dark brown?until?yesterday?when she started passing?dark?black?loose stool?for only 1 bowel movement. ?No vomiting. ?Report?having EGD &?colonoscopy?3 years ago?and 2?polyps were removed?and plan for repeat colonoscopy in 5 years. ?No weight loss. ?On arrival to ED, Hypertensive /104, afebrile.? Labs notable for Hb 8.4 (last Hb 9.6 in 2/2019), potassium 2.8, CO2 30.??CT head?showed no intracranial abnormalities. ?Chest x-ray?no acute cardiopulmonary process. EKG NSR. ?Was given IV hydralazine, Protonix 80mg IV and referred to hospitalist medicine service for further evaluation?and management.  Review of Systems  Except as documented, all other systems reviewed and are negative.  Physical Exam  Vitals & Measurements  T:?37.1? ?C (Oral)? TMIN:?37.0? ?C (Temporal Artery)? TMAX:?37.1? ?C (Oral)? HR:?124(Peripheral)? RR:?13? BP:?193/111? SpO2:?97%? WT:?91?kg?  General: Appears comfortable  HEENT:?NC/AT  Neck:?No JVD, trachea at?midline  Chest:?CTABL, no rales or rhonchi, no accessory muscle use  CVS:?Regular rhythm. Normal S1/S2. No gallops, murmurs or rub. No  Abdomen:?Nondistended, normoactive  bowel sound, epigastric tenderness  Lymph:?no cervical, supraclavicular or inguinal lymphadenopathy  Extremities:?no clubbing or cyanosis  Skin:?Warm and dry, no rashes or lesions  Neuro:?AAOx3, no focal neurological deficit  Psych:?Cooperative  Assessment/Plan  # Hypertensive urgency - (suspect secondary hypertension)  # Vertigo secondary to the above  # Hypokalemia  # GI bleed/Melena  # Iron deficiency anemia  # History of asthma, migraine, anxiety, depression  ?  Plan:  ?  -Type and cross, hold 2 units PRBC, repeat CBC in 4 hrs, transfuse if Hb < 7  -Protonix 40 mg IV twice daily  -CT abdomen pelvis with contrast  -GI consult  -Hydralazine?and labetalol?IV PRN, goal SBP < 160  -Start labetalol 200mg PO BID, continue amlodipine 10mg daily  -Replace?potassium  -Renal artery Doppler  -Check renin/aldosterone level  -NPO except meds  -Resume home meds, hold aspirin  -AM labs  ?  ?  VTE Prophylaxis:?SCDs  Disposition: Inpatient-?Telemetry  Code status: Full code  ?   Time spent on admission: 72 minutes   Problem List/Past Medical History  GERD  Uncontrolled hypertension  Migraine headache  PVD  Asthma  TIA  BAYLEE  Depression  Anxiety  Procedure/Surgical History  43707 - LT REM LENS MATERIAL PPV (Left) (04/10/2017)  Excision of Left Vitreous, Percutaneous Approach (04/10/2017)  Extraction of Left Lens, Percutaneous Approach (04/10/2017)  Removal of lens material; pars plana approach, with or without vitrectomy (04/10/2017)  Cataract OU  Colonoscopy  Esophagogastroduodenoscopy  Hysterectomy  Tonsillectomy   Medications  Inpatient  cloNIDine, 0.1 mg= 1 tab(s), Oral, TID, PRN  hydrALAZINE (Apresoline) Inj., 20 mg= 1 mL, IV Push, q4hr, PRN  iron sucrose (for IVPB)  K-Dur 20 oral tablet, extended release, 40 mEq= 2 tab(s), Oral, Once  KCl 20meq/100mL IVPB Premix, 20 mEq= 100 mL, IV Piggyback, BID  labetalol 200 mg oral tablet, 200 mg= 1 tab(s), Oral, BID  labetalol 5 mg/mL intravenous solution, 20 mg= 4 mL, IV Push, q2hr,  PRN  Protonix 40 mg Vial (IV Push), 40 mg= 1 EA, IV Slow, q12hr  Zofran 2 mg/mL injectable solution, 4 mg= 2 mL, IV Push, q4hr, PRN  Home  AMLODIPINE BESYLATE 10 MG T, 10 mg= 1 tab(s), Oral, Daily  Aspir 81 oral delayed release tablet, 81 mg= 1 tab(s), Oral, Every other day  ATORVASTATIN 40 MG TABLET, 40 mg= 1 tab(s), Oral, Daily  GABAPENTIN 300 MG CAPSULE, See Instructions  gabapentin 300 mg oral capsule, 300 mg= 1 cap(s), Oral, At Bedtime  ondansetron 4 mg oral tablet, 4 mg= 1 tab(s), Oral, q8hr, PRN  Pantoprazole 40 mg ORAL EC-Tablet, 40 mg= 1 tab(s), Oral, Daily  Poly Iron 150 Forte, 1 tab, Oral, Daily  ranitidine 300 mg oral tablet, 300 mg= 1 tab(s), Oral, Daily  TOPIRAMATE 25 MG TABLET, 25 mg= 1 tab(s), Oral, BID  VENLAFAXINE HCL ER 75 MG CA, 225 mg= 3 cap(s), Oral, Daily  VENTOLIN HFA 90 MCG INHALER  Allergies  lisinopril?(angio adema)  Social History  Non-smoker  No alcohol  No illicit drug  Family History  Aunt with gastric cancer  Lab Results  Labs Last 24 Hours?  ?Chemistry? Hematology/Coagulation?   Sodium Lvl: 140 mmol/L (04/01/19 18:18:00) PT: 13.3 second(s) (04/01/19 18:18:00)   Potassium Lvl:?2.8 mmol/L?Low (04/01/19 18:18:00) INR: 1 (04/01/19 18:18:00)   Chloride: 105 mmol/L (04/01/19 18:18:00) PTT: 27.7 second(s) (04/01/19 18:18:00)   CO2: 30 mmol/L (04/01/19 18:18:00) WBC:?11.7 x10(3)/mcL?High (04/01/19 18:18:00)   Calcium Lvl: 9.3 mg/dL (04/01/19 18:18:00) RBC: 5.03 x10(6)/mcL (04/01/19 18:18:00)   Magnesium Lvl: 2 mg/dL (04/01/19 18:18:00) Hgb:?8.4 gm/dL?Low (04/01/19 18:18:00)   Glucose Lvl:?141 mg/dL?High (04/01/19 18:18:00) Hct:?29 %?Low (04/01/19 18:18:00)   BUN: 12 mg/dL (04/01/19 18:18:00) Platelet:?411 x10(3)/mcL?High (04/01/19 18:18:00)   Creatinine: 0.88 mg/dL (04/01/19 18:18:00) MCV:?57.7 fL?Low (04/01/19 18:18:00)   eGFR-AA: >60 (04/01/19 18:18:00) MCH:?16.7 pg?Low (04/01/19 18:18:00)   eGFR-MARYELLEN: >60 (04/01/19 18:18:00) MCHC:?29 gm/dL?Low (04/01/19 18:18:00)   Amylase Lvl: 44 unit/L  (04/01/19 18:18:00) RDW:?29 %?High (04/01/19 18:18:00)   Bili Total: 0.3 mg/dL (04/01/19 18:18:00) MPV: n/a (04/01/19 18:18:00)   Bili Direct: 0.1 mg/dL (04/01/19 18:18:00) Abs Neut:?9.56 x10(3)/mcL?High (04/01/19 18:18:00)   Bili Indirect: 0.2 mg/dL (04/01/19 18:18:00) Neutro Auto: 82 % (04/01/19 18:18:00)   AST:?10 unit/L?Low (04/01/19 18:18:00) Lymph Auto: 12 % (04/01/19 18:18:00)   ALT: 16 unit/L (04/01/19 18:18:00) Mono Auto: 6 % (04/01/19 18:18:00)   Alk Phos: 121 unit/L (04/01/19 18:18:00) Eos Auto: 0 % (04/01/19 18:18:00)   Total Protein: 7.7 gm/dL (04/01/19 18:18:00) Abs Eos: 0 x10(3)/mcL (04/01/19 18:18:00)   Albumin Lvl: 3.6 gm/dL (04/01/19 18:18:00) Basophil Auto: 0 % (04/01/19 18:18:00)   Globulin:?4.1 gm/dL?High (04/01/19 18:18:00) Abs Neutro:?9.56 x10(3)/mcL?High (04/01/19 18:18:00)   A/G Ratio:?0.9 ratio?Low (04/01/19 18:18:00) Abs Lymph: 1.3 x10(3)/mcL (04/01/19 18:18:00)   Iron Lvl:?21 mcg/dL?Low (04/01/19 18:18:00) Abs Mono: 0.7 x10(3)/mcL (04/01/19 18:18:00)   Transferrin: 323 mg/dL (04/01/19 18:18:00) Abs Baso: 0 x10(3)/mcL (04/01/19 18:18:00)   TIBC: 449 mcg/dL (04/01/19 18:18:00) NRBC%: 0.2 % (04/01/19 18:18:00)   Iron Sat:?4.7 %?Low (04/01/19 18:18:00) Hypochrom:?1?High (04/01/19 18:18:00)   Ferritin Lvl:?4.2 ng/mL?Low (04/01/19 18:18:00) Platelet Est: h Abnormal (04/01/19 18:18:00)   Lactic Acid Lvl: 2 mmol/L (04/01/19 18:18:00) Anisocyte:?2?High (04/01/19 18:18:00)   Lipase Lvl: 90 unit/L (04/01/19 18:18:00) Poik:?1?High (04/01/19 18:18:00)   Total CK: 56 unit/L (04/01/19 18:18:00) Microcyte:?1?High (04/01/19 18:18:00)   CK MB: 0.7 ng/mL (04/01/19 18:18:00) Polychrom:?1?High (04/01/19 18:18:00)   Troponin-I: <0.02 (04/01/19 18:18:00) Schistocyte:?1?High (04/01/19 18:18:00)   TSH: 1.53 mIU/L (04/01/19 18:18:00) Target Cell:?1?High (04/01/19 18:18:00)   U pH:?8?High (04/01/19 17:53:00)    U Spec Grav: 1.015 (04/01/19 17:53:00)    Diagnostic Results  Accession:?[id number]  Order:?CT Head W/O  Contrast  Report Dt/Tm:?04/01/2019 19:32  Report:?  ?  Clinical History:  Dizziness  ?  Technique:  Axial CT of the brain were obtained without contrast. There are  osseous reconstructed images available for review with coronal and  sagittal reconstructions.  ?  Automatic exposure control was utilized to reduce the patients  radiation dose.  ?  Comparison:  No prior imaging available for comparison.  ?  Findings:  No acute intracranial hemorrhage.  Diffuse cerebral atrophy with concordant ventricular enlargement.?  There is normal gray white differentiation.?  The osseous structures are normal.?  The mastoid air cells are clear.?  Debris within the right auditory canal.  The globes and orbital contents are normal bilaterally.  The visualized maxillary, ethmoid and sphenoid sinuses are clear.  ?  Impression  No acute intracranial hemorrhage. Findings of chronic microvascular  ischemic disease.  ?  ?  Accession:?[id number]  Order:?XR Chest 1 View  Report Dt/Tm:?04/01/2019 18:12  Report:?  Clinical History  Cough  ?  Technique  Portable Single view AP radiograph of the chest.  ?  Comparison  February 17, 2019  ?  Findings  No focal opacification, pleural effusion, or pneumothorax. The  cardiomediastinal silhouette is within normal limits. No acute osseous  abnormality.  ?  IMPRESSION:  No acute cardiopulmonary process.?

## 2022-08-15 ENCOUNTER — DOCUMENTATION ONLY (OUTPATIENT)
Dept: ADMINISTRATIVE | Facility: HOSPITAL | Age: 67
End: 2022-08-15
Payer: MEDICARE

## 2022-10-11 ENCOUNTER — OFFICE VISIT (OUTPATIENT)
Dept: HEMATOLOGY/ONCOLOGY | Facility: CLINIC | Age: 67
End: 2022-10-11
Payer: MEDICARE

## 2022-10-11 VITALS
WEIGHT: 203.5 LBS | TEMPERATURE: 99 F | HEART RATE: 86 BPM | SYSTOLIC BLOOD PRESSURE: 148 MMHG | OXYGEN SATURATION: 97 % | RESPIRATION RATE: 20 BRPM | BODY MASS INDEX: 37.45 KG/M2 | HEIGHT: 62 IN | DIASTOLIC BLOOD PRESSURE: 94 MMHG

## 2022-10-11 DIAGNOSIS — D50.9 IRON DEFICIENCY ANEMIA, UNSPECIFIED IRON DEFICIENCY ANEMIA TYPE: Primary | ICD-10-CM

## 2022-10-11 PROCEDURE — 1101F PR PT FALLS ASSESS DOC 0-1 FALLS W/OUT INJ PAST YR: ICD-10-PCS | Mod: CPTII,,,

## 2022-10-11 PROCEDURE — 3288F PR FALLS RISK ASSESSMENT DOCUMENTED: ICD-10-PCS | Mod: CPTII,,,

## 2022-10-11 PROCEDURE — 3288F FALL RISK ASSESSMENT DOCD: CPT | Mod: CPTII,,,

## 2022-10-11 PROCEDURE — 99215 PR OFFICE/OUTPT VISIT, EST, LEVL V, 40-54 MIN: ICD-10-PCS | Mod: ,,,

## 2022-10-11 PROCEDURE — 3008F BODY MASS INDEX DOCD: CPT | Mod: CPTII,,,

## 2022-10-11 PROCEDURE — 3080F PR MOST RECENT DIASTOLIC BLOOD PRESSURE >= 90 MM HG: ICD-10-PCS | Mod: CPTII,,,

## 2022-10-11 PROCEDURE — 1159F PR MEDICATION LIST DOCUMENTED IN MEDICAL RECORD: ICD-10-PCS | Mod: CPTII,,,

## 2022-10-11 PROCEDURE — 3077F SYST BP >= 140 MM HG: CPT | Mod: CPTII,,,

## 2022-10-11 PROCEDURE — 1159F MED LIST DOCD IN RCRD: CPT | Mod: CPTII,,,

## 2022-10-11 PROCEDURE — 3077F PR MOST RECENT SYSTOLIC BLOOD PRESSURE >= 140 MM HG: ICD-10-PCS | Mod: CPTII,,,

## 2022-10-11 PROCEDURE — 3008F PR BODY MASS INDEX (BMI) DOCUMENTED: ICD-10-PCS | Mod: CPTII,,,

## 2022-10-11 PROCEDURE — 1101F PT FALLS ASSESS-DOCD LE1/YR: CPT | Mod: CPTII,,,

## 2022-10-11 PROCEDURE — 3080F DIAST BP >= 90 MM HG: CPT | Mod: CPTII,,,

## 2022-10-11 PROCEDURE — 99215 OFFICE O/P EST HI 40 MIN: CPT | Mod: ,,,

## 2022-10-11 RX ORDER — GABAPENTIN 300 MG/1
300 CAPSULE ORAL 3 TIMES DAILY
COMMUNITY

## 2022-10-11 RX ORDER — ASPIRIN 81 MG/1
81 TABLET ORAL DAILY
COMMUNITY

## 2022-10-11 RX ORDER — FOLIC ACID 1 MG/1
1 TABLET ORAL DAILY
COMMUNITY
End: 2023-04-05 | Stop reason: SDUPTHER

## 2022-10-11 RX ORDER — BUSPIRONE HYDROCHLORIDE 7.5 MG/1
7.5 TABLET ORAL 3 TIMES DAILY
COMMUNITY

## 2022-10-11 RX ORDER — METOPROLOL SUCCINATE 100 MG/1
100 TABLET, EXTENDED RELEASE ORAL DAILY
COMMUNITY

## 2022-10-11 RX ORDER — SPIRONOLACTONE 25 MG/1
25 TABLET ORAL DAILY
COMMUNITY

## 2022-10-11 RX ORDER — CHOLECALCIFEROL (VITAMIN D3) 25 MCG
1000 TABLET ORAL DAILY
COMMUNITY

## 2022-10-11 RX ORDER — PANTOPRAZOLE SODIUM 40 MG/1
40 TABLET, DELAYED RELEASE ORAL DAILY
COMMUNITY

## 2022-10-11 RX ORDER — ALBUTEROL SULFATE 90 UG/1
2 AEROSOL, METERED RESPIRATORY (INHALATION) EVERY 6 HOURS PRN
COMMUNITY

## 2022-10-11 RX ORDER — FERROUS SULFATE 325(65) MG
325 TABLET, DELAYED RELEASE (ENTERIC COATED) ORAL DAILY
Qty: 30 TABLET | Refills: 11 | Status: SHIPPED | OUTPATIENT
Start: 2022-10-11 | End: 2023-05-12 | Stop reason: SDUPTHER

## 2022-10-11 NOTE — PROGRESS NOTES
[facility] Hematology/Oncology  PROGRESS NOTE      Subjective:         NAME: Valery Winchester : 1955     67 y.o. female   MRN: [id number]    Referring Doc: No ref. provider found        Chief Complaint:    Chief Complaint   Patient presents with    Anemia       Oncology/Hematology History:  Oncology History    No history exists.   HPI: 67 y/o F w/ PMHx of HTN, HLD, anemia, GERD, depression referred to Hematology at [facility] for anemia    EGD 19 unremarkable. FOBT at same time positive  Colonoscopy 4/3/19 found nonthrombosed internal hemorrhoids, diverticulosis in sigmoid and descending colon but no clear source of blood loss.  Nuclear medicine bleeding scan showed equivocal focus of GI bleeding in mid small bowel loops in left lower quadrant  M2A capsule endoscopy showed a few small erosions in terminal ileum but otherwise no gross blood loss in whole small bowel.        Interval History:  Today 10/11/22: pt here today for follow up visit. She is doing well overall and is taking folic acid daily as directed. She does report chronic fatigue. She denies n/v/c/d, denies melena or hematochezia. Denies SOB, CP, ORTEGA, fevers, chills. Energy level stable. No other complaints or concerns reported.            ROS:   GEN: normal without any fever, night sweats or weight loss  HEENT: normal with no HA's, sore throat, stiff neck, changes in vision  CV: normal with no CP, SOB, PND, ORTEGA or orthopnea  PULM: normal with no SOB, cough, hemoptysis, sputum or pleuritic pain  GI: normal with no abdominal pain, nausea, vomiting, constipation, diarrhea, melanotic stools, BRBPR, or hematemesis  : normal with no hematuria, dysuria  BREAST: normal with no mass, discharge, pain  SKIN: normal with no rash, erythema, bruising, or swelling    Allergies:  Review of patient's allergies indicates:   Allergen Reactions    Lisinopril      Other reaction(s): angio adema       Medications:    Current Outpatient Medications:     albuterol  (PROVENTIL/VENTOLIN HFA) 90 mcg/actuation inhaler, Inhale 2 puffs into the lungs every 6 (six) hours as needed. Rescue, Disp: , Rfl:     AMLODIPINE BESYLATE, BULK, MISC, 10 mg by Misc.(Non-Drug; Combo Route) route once daily., Disp: , Rfl:     aspirin (ECOTRIN) 81 MG EC tablet, Take 81 mg by mouth once daily., Disp: , Rfl:     busPIRone (BUSPAR) 7.5 MG tablet, Take 7.5 mg by mouth 3 (three) times daily., Disp: , Rfl:     folic acid (FOLVITE) 1 MG tablet, Take 1 mg by mouth once daily., Disp: , Rfl:     gabapentin (NEURONTIN) 300 MG capsule, Take 300 mg by mouth 3 (three) times daily., Disp: , Rfl:     metoprolol succinate (TOPROL-XL) 100 MG 24 hr tablet, Take 100 mg by mouth once daily., Disp: , Rfl:     pantoprazole (PROTONIX) 40 MG tablet, Take 40 mg by mouth once daily., Disp: , Rfl:     spironolactone (ALDACTONE) 25 MG tablet, Take 25 mg by mouth once daily., Disp: , Rfl:     vitamin D (VITAMIN D3) 1000 units Tab, Take 1,000 Units by mouth once daily., Disp: , Rfl:     ferrous sulfate 325 (65 FE) MG EC tablet, Take 1 tablet (325 mg total) by mouth once daily. Take on empty stomach. May eat an hour later. Take with vit C (glass orange juice or 500 mg Vit C tablets)., Disp: 30 tablet, Rfl: 11    PMHx/PSHx Updates:   Past Medical History:   Diagnosis Date    Anemia, unspecified     Essential (primary) hypertension      Past Surgical History:   Procedure Laterality Date    COLONOSCOPY  04/03/2019       Family History:  Family History   Problem Relation Age of Onset    Hypertension Mother     Hypertension Father     Liver disease Father     Asthma Sister        Social History:  Social History     Socioeconomic History    Marital status:    Tobacco Use    Smoking status: Never     Passive exposure: Never    Smokeless tobacco: Never   Substance and Sexual Activity    Alcohol use: Never    Drug use: Never    Sexual activity: Never         Pathology:    none    Objective:     ECOG SCORE    0 - Fully active-able  "to carry on all pre-disease performance without restriction         Vitals:  Blood pressure (!) 148/94, pulse 86, temperature 98.5 °F (36.9 °C), temperature source Oral, resp. rate 20, height 5' 2" (1.575 m), weight 92.3 kg (203 lb 8 oz), SpO2 97 %.    Physical Examination:   GEN: no apparent distress, comfortable; AAOx3  HEAD: atraumatic and normocephalic  EYES: no pallor, no icterus, PERRLA  ENT: OMM, no pharyngeal erythema, external ears WNL; no nasal discharge; no thrush  NECK: no masses, thyroid normal, trachea midline, no LAD/LN's, supple  CV: RRR with no murmur; normal pulse; normal S1 and S2; no pedal edema  CHEST: Normal respiratory effort; CTAB; normal breath sounds; no wheeze or crackles  ABDOM: nontender and nondistended; soft; normal bowel sounds; no rebound/guarding  MUSC/Skeletal: ROM normal; no crepitus; joints normal; no deformities or arthropathy  EXTREM: no clubbing, cyanosis, inflammation or swelling  SKIN: no rashes, lesions, ulcers, petechiae or subcutaneous nodules  : no moreno  NEURO: grossly intact; motor/sensory WNL; AAOx3; no tremors  PSYCH: normal mood, affect and behavior  LYMPH: normal cervical, supraclavicular, axillary and groin LN's    Labs:  10/4/2022 WBC 7.0, RBC 4.42, hgb 9.1, MCV 68.6, plt 320, ANC 5.02, retic 22.7, iron 55, ironsat 17%, ferritin 23 folic acid 14  10/6/21  Folate 10.9 B12 455 WBC 5.1 RBC 5.29 Hg 11.4 MCV 69.4 RDW 21.8  ANC 3.26 Cr 1.07 Alb 4.1 TP 6.8 Ferritin 122 Iron 56 TIBC 273 Retic Hg 25.4  6/15/21 Cr 1.10, Alb 4.0, TP 6.8, Vit D 34, WBC 6.8, Hgb 11.6, MCV 67.3, RDW 22.2, , ANC 4.78, TSH 1.60, Ferritin 186, iron 51. TIBC 268, B12 578, folic acid >20  10/19/20 Cr 0.72, TP 6.3, albumin 3.9, WBC 5.1, Hg 11.4, RBC 5.26, MCV 68.6, RDW 20.6, , ANC 3.16, TSH 1.63, T4 7.6, ferritin 255, serum iron 92, TIBC 239, iron sat 38%, folic acid 17.8, retic 1.6  6/9/20 WBC 5.6, Hgb 12.2, Hct 38.9, MCV 69.1, MCH 21.7, RDW 19.7  2/26/20 Hgb 13.0, " "Iron 69, TIBC 256, Ferritin 456, Folic acid 34.69, B12 626  11/5/19 -a/-a homozygous Hg electrophoresis A 55.2% Other 44.5% WBC 6.7 Hg 12.5  MCV 66.9 RBC 5.83 RDW 27.3 Hep C neg Hep B Cab neg Hep B Sag neg B12 777 Ferritin 675 Folate 19.24 Iron 80 TIBC 268  AlkPhos 131 Cr 0.83  8/27/19 Cr 1 AST 15 ALT 18 Bili 0.3 WBC 5.1 RBC 5.32 Hg 10.1 MCV 60.9 RDW 30.2  Ferritin 48 Iron 44 TIBC 333  2/18/19 Ferritin 12 Hg electrophoresis Hg A2 1.7 Hg A1 52.2 Hg G 46.1%  1/30/19 FOBT neg  1/23/19 folic acid 4.9    Radiology/Diagnostic Studies:  6/25/21 MMG benign  12/23/19 MMG screening: benign  Other imaging reviewed, nothing pertinent    I have reviewed all available lab results and radiology reports.    Assessment/Plan:   1. Iron deficiency anemia due to chronic blood loss D50.0 She had positive FOBT previously. Extensive workup as detailed above negative other than some thrombosed internal hemorrhoids, diverticulosis and possibly small erosions in terminal ileum. No active bleeding noted  Her prior iron indices are consistent with BAYLEE, though most recent one 10/2021 was WNL. She has no other source of blood loss  Remains asymptomatic, Will continue to monitor  Anemia and fatigue worsened. Iron stores below normal. Will trial oral iron prescription sent to pharmacy.    Call if any questions or concerns     2. 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 Her low MCV is noted on labs dating to at least 2015, with RBC >5 million but with Hg ~10. 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 Folic acid deficiency E53.8 Previously noted to have low folic acid. Repeat folic acid level WNL. C/w folic acid 1mg daily     4. HTN (hypertension) I10 C/w BP medications as prescribed by PCP. Discussed that she needs to start cooking her food and avoid fast foods or food high in salt content. Has follow up with PCP next month  I also advised her that she needs repeat Pap smear. She will discuss with PCP. Will refer to GYN for abnormal pap  MMG 6/25/21 benign, repeat due in 1 year      PLAN:  RTC 6 months  Cbc cmp ferritin iron folate  Ferrous sulfate sent to pharmacy    Discussion:     I have explained all of the above in detail and the patient understands all of the current recommendation(s). I have answered all of their questions to the best of my ability and to their complete satisfaction.   The patient is to continue with the current management plan.            Electronically signed by Katey Troy NP

## 2022-12-27 ENCOUNTER — HOSPITAL ENCOUNTER (EMERGENCY)
Facility: HOSPITAL | Age: 67
Discharge: HOME | End: 2022-12-27
Attending: SPECIALIST
Payer: MEDICARE

## 2022-12-27 VITALS
RESPIRATION RATE: 20 BRPM | SYSTOLIC BLOOD PRESSURE: 156 MMHG | DIASTOLIC BLOOD PRESSURE: 87 MMHG | OXYGEN SATURATION: 96 % | BODY MASS INDEX: 37.54 KG/M2 | WEIGHT: 204 LBS | HEART RATE: 93 BPM | HEIGHT: 62 IN | TEMPERATURE: 98 F

## 2022-12-27 DIAGNOSIS — D64.9 CHRONIC ANEMIA: ICD-10-CM

## 2022-12-27 DIAGNOSIS — K21.9 GASTROESOPHAGEAL REFLUX DISEASE, UNSPECIFIED WHETHER ESOPHAGITIS PRESENT: ICD-10-CM

## 2022-12-27 DIAGNOSIS — K92.1 MELENA: Primary | ICD-10-CM

## 2022-12-27 LAB
ALBUMIN SERPL-MCNC: 3.6 G/DL (ref 3.4–4.8)
ALBUMIN/GLOB SERPL: 1 RATIO (ref 1.1–2)
ALP SERPL-CCNC: 89 UNIT/L (ref 40–150)
ALT SERPL-CCNC: 7 UNIT/L (ref 0–55)
APPEARANCE UR: CLEAR
AST SERPL-CCNC: 11 UNIT/L (ref 5–34)
BACTERIA #/AREA URNS AUTO: ABNORMAL /HPF
BASOPHILS # BLD AUTO: 0.02 X10(3)/MCL (ref 0–0.2)
BASOPHILS NFR BLD AUTO: 0.2 %
BILIRUB UR QL STRIP.AUTO: NEGATIVE MG/DL
BILIRUBIN DIRECT+TOT PNL SERPL-MCNC: 0.3 MG/DL
BUN SERPL-MCNC: 17.2 MG/DL (ref 9.8–20.1)
CALCIUM SERPL-MCNC: 9.8 MG/DL (ref 8.4–10.2)
CHLORIDE SERPL-SCNC: 102 MMOL/L (ref 98–107)
CO2 SERPL-SCNC: 26 MMOL/L (ref 23–31)
COLOR UR AUTO: YELLOW
CREAT SERPL-MCNC: 0.9 MG/DL (ref 0.55–1.02)
EOSINOPHIL # BLD AUTO: 0.14 X10(3)/MCL (ref 0–0.9)
EOSINOPHIL NFR BLD AUTO: 1.4 %
ERYTHROCYTE [DISTWIDTH] IN BLOOD BY AUTOMATED COUNT: 23.4 % (ref 11–14.5)
GFR SERPLBLD CREATININE-BSD FMLA CKD-EPI: >60 MLS/MIN/1.73/M2
GLOBULIN SER-MCNC: 3.5 GM/DL (ref 2.4–3.5)
GLUCOSE SERPL-MCNC: 121 MG/DL (ref 82–115)
GLUCOSE UR QL STRIP.AUTO: NEGATIVE MG/DL
HCT VFR BLD AUTO: 30.5 % (ref 37–47)
HEMOCCULT SP1 STL QL: POSITIVE
HGB BLD-MCNC: 9.4 GM/DL (ref 12–16)
IMM GRANULOCYTES # BLD AUTO: 0.06 X10(3)/MCL (ref 0–0.04)
IMM GRANULOCYTES NFR BLD AUTO: 0.6 %
KETONES UR QL STRIP.AUTO: NEGATIVE MG/DL
LEUKOCYTE ESTERASE UR QL STRIP.AUTO: NEGATIVE UNIT/L
LYMPHOCYTES # BLD AUTO: 1.37 X10(3)/MCL (ref 0.6–4.6)
LYMPHOCYTES NFR BLD AUTO: 13.5 %
MCH RBC QN AUTO: 20 PG
MCHC RBC AUTO-ENTMCNC: 30.8 MG/DL (ref 33–36)
MCV RBC AUTO: 64.9 FL (ref 80–94)
MONOCYTES # BLD AUTO: 0.77 X10(3)/MCL (ref 0.1–1.3)
MONOCYTES NFR BLD AUTO: 7.6 %
NEUTROPHILS # BLD AUTO: 7.77 X10(3)/MCL (ref 2.1–9.2)
NEUTROPHILS NFR BLD AUTO: 76.7 %
NITRITE UR QL STRIP.AUTO: NEGATIVE
PH UR STRIP.AUTO: 7.5 [PH]
PLATELET # BLD AUTO: 317 X10(3)/MCL (ref 140–371)
PMV BLD AUTO: ABNORMAL FL
POTASSIUM SERPL-SCNC: 3.9 MMOL/L (ref 3.5–5.1)
PROT SERPL-MCNC: 7.1 GM/DL (ref 5.8–7.6)
PROT UR QL STRIP.AUTO: 30 MG/DL
RBC # BLD AUTO: 4.7 X10(6)/MCL (ref 4.2–5.4)
RBC #/AREA URNS AUTO: ABNORMAL /HPF
RBC UR QL AUTO: ABNORMAL UNIT/L
SODIUM SERPL-SCNC: 137 MMOL/L (ref 136–145)
SP GR UR STRIP.AUTO: 1.02
SQUAMOUS #/AREA URNS AUTO: ABNORMAL /HPF
UROBILINOGEN UR STRIP-ACNC: 1 MG/DL
WBC # SPEC AUTO: 10.1 X10(3)/MCL (ref 4.5–11.5)
WBC #/AREA URNS AUTO: ABNORMAL /HPF

## 2022-12-27 PROCEDURE — 25500020 PHARM REV CODE 255: Performed by: SPECIALIST

## 2022-12-27 PROCEDURE — 85025 COMPLETE CBC W/AUTO DIFF WBC: CPT | Performed by: SPECIALIST

## 2022-12-27 PROCEDURE — C9113 INJ PANTOPRAZOLE SODIUM, VIA: HCPCS | Performed by: SPECIALIST

## 2022-12-27 PROCEDURE — 81001 URINALYSIS AUTO W/SCOPE: CPT | Performed by: SPECIALIST

## 2022-12-27 PROCEDURE — 63600175 PHARM REV CODE 636 W HCPCS: Performed by: SPECIALIST

## 2022-12-27 PROCEDURE — 96374 THER/PROPH/DIAG INJ IV PUSH: CPT

## 2022-12-27 PROCEDURE — 82270 OCCULT BLOOD FECES: CPT | Performed by: SPECIALIST

## 2022-12-27 PROCEDURE — 80053 COMPREHEN METABOLIC PANEL: CPT | Performed by: SPECIALIST

## 2022-12-27 PROCEDURE — 99285 EMERGENCY DEPT VISIT HI MDM: CPT | Mod: 25

## 2022-12-27 RX ORDER — SUCRALFATE 1 G/1
1 TABLET ORAL 4 TIMES DAILY
Qty: 120 TABLET | Refills: 0 | Status: SHIPPED | OUTPATIENT
Start: 2022-12-27

## 2022-12-27 RX ORDER — PANTOPRAZOLE SODIUM 40 MG/10ML
80 INJECTION, POWDER, LYOPHILIZED, FOR SOLUTION INTRAVENOUS
Status: COMPLETED | OUTPATIENT
Start: 2022-12-27 | End: 2022-12-27

## 2022-12-27 RX ORDER — HYDROCODONE BITARTRATE AND ACETAMINOPHEN 5; 325 MG/1; MG/1
1 TABLET ORAL
Status: DISCONTINUED | OUTPATIENT
Start: 2022-12-27 | End: 2022-12-28 | Stop reason: HOSPADM

## 2022-12-27 RX ADMIN — PANTOPRAZOLE SODIUM 80 MG: 40 INJECTION, POWDER, LYOPHILIZED, FOR SOLUTION INTRAVENOUS at 09:12

## 2022-12-27 RX ADMIN — IOPAMIDOL 100 ML: 755 INJECTION, SOLUTION INTRAVENOUS at 09:12

## 2022-12-28 NOTE — ED PROVIDER NOTES
Encounter Date: 12/27/2022       History     Chief Complaint   Patient presents with    Melena     Black tarry stools that started yesterday. LUQ abd pain off and on x a few months.      Patient presents with black tarry stools that began yesterday and also reports left upper quadrant pain off and on for the last few months; patient is currently on Protonix as well as aspirin but states she has not taken; she has a history of GERD and diverticulosis; she states her last colonoscopy was 3 years ago revealing diverticuli; she also reports having an EGD but said it was normal; patient denies shortness of breath or chest pain, no nausea or vomiting    The history is provided by the patient and the EMS personnel.   Review of patient's allergies indicates:   Allergen Reactions    Lisinopril      Other reaction(s): angio adema     Past Medical History:   Diagnosis Date    Anemia, unspecified     Essential (primary) hypertension      Past Surgical History:   Procedure Laterality Date    COLONOSCOPY  04/03/2019     Family History   Problem Relation Age of Onset    Hypertension Mother     Hypertension Father     Liver disease Father     Asthma Sister      Social History     Tobacco Use    Smoking status: Never     Passive exposure: Never    Smokeless tobacco: Never   Substance Use Topics    Alcohol use: Never    Drug use: Never     Review of Systems   Constitutional: Negative.    HENT: Negative.     Respiratory: Negative.     Cardiovascular: Negative.    Gastrointestinal:  Positive for abdominal pain.   Musculoskeletal: Negative.    Skin: Negative.    Neurological: Negative.    All other systems reviewed and are negative.    Physical Exam     Initial Vitals [12/27/22 2021]   BP Pulse Resp Temp SpO2   (!) 187/102 93 20 98.4 °F (36.9 °C) 98 %      MAP       --       Physical exam done with nurse present  Physical Exam    Nursing note and vitals reviewed.  Constitutional: She appears well-developed and well-nourished.   HENT:    Head: Normocephalic and atraumatic.   Eyes: EOM are normal. Pupils are equal, round, and reactive to light.   Neck: Neck supple.   Normal range of motion.  Cardiovascular:  Normal rate, regular rhythm, normal heart sounds and intact distal pulses.           Pulmonary/Chest: Breath sounds normal.   Abdominal: Abdomen is soft. Bowel sounds are normal. She exhibits no distension. There is abdominal tenderness (left upper quadrant and left lower quadrant pain to palpation).   Rectal exam revealed external hemorrhoid with good rectal tone and dark colored stool There is no rebound and no guarding.   Musculoskeletal:         General: Normal range of motion.      Cervical back: Normal range of motion and neck supple.     Neurological: She is alert and oriented to person, place, and time. She has normal strength.   Skin: Skin is warm and dry.       ED Course   Procedures  Labs Reviewed   COMPREHENSIVE METABOLIC PANEL - Abnormal; Notable for the following components:       Result Value    Glucose Level 121 (*)     Albumin/Globulin Ratio 1.0 (*)     All other components within normal limits   CBC WITH DIFFERENTIAL - Abnormal; Notable for the following components:    Hgb 9.4 (*)     Hct 30.5 (*)     MCV 64.9 (*)     MCHC 30.8 (*)     RDW 23.4 (*)     IG# 0.06 (*)     All other components within normal limits   OCCULT BLOOD STOOL, CA SCREEN - Abnormal; Notable for the following components:    Occult Blood Stool 1 Positive (*)     All other components within normal limits   URINALYSIS, REFLEX TO URINE CULTURE - Abnormal; Notable for the following components:    Protein, UA 30 (*)     Blood, UA Trace-Lysed (*)     All other components within normal limits   URINALYSIS, MICROSCOPIC - Abnormal; Notable for the following components:    RBC, UA 6-10 (*)     All other components within normal limits   CBC W/ AUTO DIFFERENTIAL    Narrative:     The following orders were created for panel order CBC auto differential.  Procedure                                Abnormality         Status                     ---------                               -----------         ------                     CBC with Differential[[id number]]        Abnormal            Final result                 Please view results for these tests on the individual orders.   OCCULT BLOOD,STOOL,SCREEN (1-3)    Narrative:     The following orders were created for panel order Occult Blood, Stool Screening (1 -3).  Procedure                               Abnormality         Status                     ---------                               -----------         ------                     Occult Blood Stool, CA S...[[id number]]  Abnormal            Final result               OCCULT BLOOD STOOL, CA S...[[id number]]                                                   Please view results for these tests on the individual orders.   OCCULT BLOOD STOOL, CA SCREEN 2ND SPEC          Imaging Results              CT Abdomen Pelvis With Contrast (Final result)  Result time 12/27/22 21:32:30      Final result by Denzel Davis MD (12/27/22 21:32:30)                   Impression:      No acute abnormality identified within the abdomen and pelvis.      Electronically signed by: Denzel Davis  Date:    12/27/2022  Time:    21:32               Narrative:    EXAMINATION:  CT ABDOMEN PELVIS WITH CONTRAST    CLINICAL HISTORY:  Abdominal pain, acute, nonlocalized;    TECHNIQUE:  Multidetector IV contrast enhanced axial CT images of the abdomen and pelvis were obtained with coronal and sagittal reconstructions.    Automatic exposure control was utilized to reduce the patient's radiation dose.    DLP= 1204    COMPARISON:  07/13/2020    FINDINGS:  01. HEPATOBILIARY: No focal hepatic lesion is identified, The gallbladder is normal.    02. SPLEEN: Normal    03. PANCREAS: No focal masses or ductal dilatation.    04. ADRENALS: No adrenal nodules.    05. KIDNEYS: The right kidney demonstrates no stone,  hydronephrosis, or hydroureter. No focal mass identified. The left kidney demonstrates no stone, hydronephrosis, or hydroureter. No focal mass identified.    06. LYMPHADENOPATHY/RETROPERITONEUM: There is no retroperitoneal lymphadenopathy. The abdominal aorta is normal in course and caliber. There are diffuse scattered mural atheromatous calcifications in the aortoiliac system.    07. BOWEL: No acute bowel related abnormalities. No evidence of appendiceal inflammation.    08. PELVIC VISCERA: Normal. No pelvic mass.    09. PELVIC LYMPH NODES: No lymphadenopathy.    10. PERITONEUM/ABDOMINAL WALL: No ascites or implant.    11. SKELETAL: No aggressive appearing lytic/blastic lesion. No acute fractures, subluxations or dislocations.    12. LUNG BASES: The visualized lungs are unremarkable.                                       Medications   HYDROcodone-acetaminophen 5-325 mg per tablet 1 tablet (1 tablet Oral Not Given 12/27/22 2215)   pantoprazole injection 80 mg (80 mg Intravenous Given 12/27/22 2127)   iopamidoL (ISOVUE-370) injection 100 mL (100 mLs Intravenous Given 12/27/22 2128)     Medical Decision Making:   Differential Diagnosis:   PUD, gastritis, Viral illness, appendicitis, cholecystitis, hepatitis, pancreatitis, bowel obstruction, constipation   Clinical Tests:   Lab Tests: Ordered and Reviewed  The following lab test(s) were unremarkable: CBC and CMP       <> Summary of Lab: Hemoglobin and hematocrit are 9.4 and 30.5 respectively; she has a low MCV; she has stool occult positive  Radiological Study: Ordered and Reviewed  ED Management:  Patient has chronic microcytic anemia most likely thalassemia or iron deficiency; she has been taking iron; patient's vital signs are stable; she was given Protonix 80 mg IV and a prescription for Carafate; she does have a gastroenterologist and agrees to contact their office in the morning; abdominal pain and burning resolved               Patient Vitals for the past 24  "hrs:   BP Temp Pulse Resp SpO2 Height Weight   12/27/22 2135 (!) 156/87 -- 93 -- 96 % -- --   12/27/22 2130 -- -- 88 -- 98 % -- --   12/27/22 2021 (!) 187/102 98.4 °F (36.9 °C) 93 20 98 % 5' 2" (1.575 m) 92.5 kg (204 lb)   The patient is resting comfortably and in no acute distress.  She states that her symptoms have improved after treatment in Emergency Department. I personally discussed her test results and treatment plan.  Gave strict ED precautions.  Specific conditions for return to the emergency department and importance of follow up with her primary care provided or the physician listed on the discharge instructions.  Patient voices understanding and agrees to the plan discussed. All patients' questions have been answered at this time.   She has remained hemodynamically stable throughout entire stay in ED and is stable for discharge home.            Clinical Impression:   Final diagnoses:  [K92.1] Melena (Primary)  [D64.9] Chronic anemia  [K21.9] Gastroesophageal reflux disease, unspecified whether esophagitis present        ED Disposition Condition    Discharge Stable          ED Prescriptions       Medication Sig Dispense Start Date End Date Auth. Provider    sucralfate (CARAFATE) 1 gram tablet Take 1 tablet (1 g total) by mouth 4 (four) times daily. 120 tablet 12/27/2022 -- Hernandez Garcia MD          Follow-up Information       Follow up With Specialties Details Why Contact Info    Gastroenterology  Call  Call tomorrow              Hernandez Garcia MD  12/27/22 0926    "

## 2023-01-11 DIAGNOSIS — G81.94 HEMIPLEGIA, UNSPECIFIED AFFECTING LEFT NONDOMINANT SIDE: Primary | ICD-10-CM

## 2023-01-19 ENCOUNTER — HOSPITAL ENCOUNTER (OUTPATIENT)
Dept: RADIOLOGY | Facility: HOSPITAL | Age: 68
Discharge: HOME | End: 2023-01-19
Attending: NURSE PRACTITIONER
Payer: MEDICARE

## 2023-01-19 DIAGNOSIS — G81.94 HEMIPLEGIA, UNSPECIFIED AFFECTING LEFT NONDOMINANT SIDE: ICD-10-CM

## 2023-03-02 DIAGNOSIS — R22.42 LOCALIZED SWELLING OF LEFT LOWER EXTREMITY: Primary | ICD-10-CM

## 2023-03-02 DIAGNOSIS — M79.605 LEFT LEG PAIN: ICD-10-CM

## 2023-03-03 ENCOUNTER — HOSPITAL ENCOUNTER (OUTPATIENT)
Dept: RADIOLOGY | Facility: HOSPITAL | Age: 68
Discharge: HOME | End: 2023-03-03
Attending: NURSE PRACTITIONER
Payer: MEDICARE

## 2023-03-03 DIAGNOSIS — M25.569 KNEE PAIN: Primary | ICD-10-CM

## 2023-03-03 DIAGNOSIS — M25.569 KNEE PAIN: ICD-10-CM

## 2023-03-03 DIAGNOSIS — R22.42 LOCALIZED SWELLING OF LEFT LOWER EXTREMITY: ICD-10-CM

## 2023-03-03 DIAGNOSIS — M79.605 LEFT LEG PAIN: ICD-10-CM

## 2023-03-03 PROCEDURE — 73560 X-RAY EXAM OF KNEE 1 OR 2: CPT | Mod: TC,LT

## 2023-03-03 PROCEDURE — 93971 EXTREMITY STUDY: CPT | Mod: TC,LT

## 2023-04-05 DIAGNOSIS — E53.8 FOLIC ACID DEFICIENCY: ICD-10-CM

## 2023-04-05 DIAGNOSIS — D50.9 IRON DEFICIENCY ANEMIA, UNSPECIFIED IRON DEFICIENCY ANEMIA TYPE: Primary | ICD-10-CM

## 2023-04-05 RX ORDER — FOLIC ACID 1 MG/1
1 TABLET ORAL DAILY
Qty: 30 TABLET | Refills: 6 | Status: SHIPPED | OUTPATIENT
Start: 2023-04-05

## 2023-05-12 ENCOUNTER — OFFICE VISIT (OUTPATIENT)
Dept: HEMATOLOGY/ONCOLOGY | Facility: CLINIC | Age: 68
End: 2023-05-12
Payer: MEDICARE

## 2023-05-12 VITALS — WEIGHT: 208 LBS | HEIGHT: 62 IN | BODY MASS INDEX: 38.28 KG/M2

## 2023-05-12 DIAGNOSIS — D50.9 IRON DEFICIENCY ANEMIA, UNSPECIFIED IRON DEFICIENCY ANEMIA TYPE: ICD-10-CM

## 2023-05-12 PROCEDURE — 3008F BODY MASS INDEX DOCD: CPT | Mod: CPTII,95,, | Performed by: STUDENT IN AN ORGANIZED HEALTH CARE EDUCATION/TRAINING PROGRAM

## 2023-05-12 PROCEDURE — 99443 PR PHYSICIAN TELEPHONE EVALUATION 21-30 MIN: ICD-10-PCS | Mod: 95,,, | Performed by: STUDENT IN AN ORGANIZED HEALTH CARE EDUCATION/TRAINING PROGRAM

## 2023-05-12 PROCEDURE — 99443 PR PHYSICIAN TELEPHONE EVALUATION 21-30 MIN: CPT | Mod: 95,,, | Performed by: STUDENT IN AN ORGANIZED HEALTH CARE EDUCATION/TRAINING PROGRAM

## 2023-05-12 PROCEDURE — 1101F PT FALLS ASSESS-DOCD LE1/YR: CPT | Mod: CPTII,95,, | Performed by: STUDENT IN AN ORGANIZED HEALTH CARE EDUCATION/TRAINING PROGRAM

## 2023-05-12 PROCEDURE — 3008F PR BODY MASS INDEX (BMI) DOCUMENTED: ICD-10-PCS | Mod: CPTII,95,, | Performed by: STUDENT IN AN ORGANIZED HEALTH CARE EDUCATION/TRAINING PROGRAM

## 2023-05-12 PROCEDURE — 3288F FALL RISK ASSESSMENT DOCD: CPT | Mod: CPTII,95,, | Performed by: STUDENT IN AN ORGANIZED HEALTH CARE EDUCATION/TRAINING PROGRAM

## 2023-05-12 PROCEDURE — 3288F PR FALLS RISK ASSESSMENT DOCUMENTED: ICD-10-PCS | Mod: CPTII,95,, | Performed by: STUDENT IN AN ORGANIZED HEALTH CARE EDUCATION/TRAINING PROGRAM

## 2023-05-12 PROCEDURE — 1101F PR PT FALLS ASSESS DOC 0-1 FALLS W/OUT INJ PAST YR: ICD-10-PCS | Mod: CPTII,95,, | Performed by: STUDENT IN AN ORGANIZED HEALTH CARE EDUCATION/TRAINING PROGRAM

## 2023-05-12 RX ORDER — FUROSEMIDE 40 MG/1
TABLET ORAL
COMMUNITY
Start: 2023-04-27

## 2023-05-12 RX ORDER — FERROUS SULFATE 325(65) MG
325 TABLET, DELAYED RELEASE (ENTERIC COATED) ORAL DAILY
Qty: 30 TABLET | Refills: 11 | Status: SHIPPED | OUTPATIENT
Start: 2023-05-12

## 2023-05-12 RX ORDER — NIFEDIPINE 60 MG
60 TABLET, EXTENDED RELEASE 24 HR ORAL
COMMUNITY
Start: 2023-04-27

## 2023-05-12 NOTE — PROGRESS NOTES
Established Patient - Audio Only Telehealth Visit     The patient location is: Home  The chief complaint leading to consultation is: Follow up  Visit type: Virtual visit with audio only (telephone)  Total time spent with patient: 22        The reason for the audio only service rather than synchronous audio and video virtual visit was related to technical difficulties or patient preference/necessity.     Each patient to whom I provide medical services by telemedicine is:  (1) informed of the relationship between the physician and patient and the respective role of any other health care provider with respect to management of the patient; and (2) notified that they may decline to receive medical services by telemedicine and may withdraw from such care at any time. Patient verbally consented to receive this service via voice-only telephone call.       HPI:     65 y/o F w/ PMHx of HTN, HLD, anemia, GERD, depression referred to Hematology at [facility] for anemia    EGD 4/2/19 unremarkable. FOBT at same time positive  Colonoscopy 4/3/19 found nonthrombosed internal hemorrhoids, diverticulosis in sigmoid and descending colon but no clear source of blood loss.  Nuclear medicine bleeding scan showed equivocal focus of GI bleeding in mid small bowel loops in left lower quadrant  M2A capsule endoscopy showed a few small erosions in terminal ileum but otherwise no gross blood loss in whole small bowel.          Interval History:  Patient had symptoms of GI blood loss with dark tarry stools in December 2023 for which she was seen in the ER.  Patient was discharged from the ER with follow-up with genetic Gastroenterology outpatient.  She had a EGD in February 2023 which was within normal limits per patient report.  She is scheduled for colonoscopy in the near future.  She was on oral iron in the past but is asked to hold off on it until the colonoscopies completed.  Patient denies any symptoms of fatigue, chest pain, dizziness,  lightheadedness or shortness of breath.       Labs:    04/25/2023:  WBC count 5.9, hemoglobin 11, MCV 64, platelet count 400, Albumin 3.9, calcium 10.2, bilirubin 0.3, alkaline phosphatase 103, AST 9, ALT 8, ferritin 21, iron 60, % saturation 15, TIBC 398,   4/12/22  B12 341 WBC 6.3 Hg 11.1 HCT 33.4 ,MCV 66 RDW 22.2  ANC 4.39 Cr 1.18 Alb 3.8 Ferritin 84 Iron 44 TIBC 276  10/6/21  Folate 10.9 B12 455 WBC 5.1 RBC 5.29 Hg 11.4 MCV 69.4 RDW 21.8  ANC 3.26 Cr 1.07 Alb 4.1 TP 6.8 Ferritin 122 Iron 56 TIBC 273 Retic Hg 25.4  6/15/21 Cr 1.10, Alb 4.0, TP 6.8, Vit D 34, WBC 6.8, Hgb 11.6, MCV 67.3, RDW 22.2, , ANC 4.78, TSH 1.60, Ferritin 186, iron 51. TIBC 268, B12 578, folic acid >20  10/19/20 Cr 0.72, TP 6.3, albumin 3.9, WBC 5.1, Hg 11.4, RBC 5.26, MCV 68.6, RDW 20.6, , ANC 3.16, TSH 1.63, T4 7.6, ferritin 255, serum iron 92, TIBC 239, iron sat 38%, folic acid 17.8, retic 1.6  6/9/20 WBC 5.6, Hgb 12.2, Hct 38.9, MCV 69.1, MCH 21.7, RDW 19.7  2/26/20 Hgb 13.0, Iron 69, TIBC 256, Ferritin 456, Folic acid 34.69, B12 626  11/5/19 -a/-a homozygous Hg electrophoresis A 55.2% Other 44.5%  WBC 6.7 Hg 12.5  MCV 66.9 RBC 5.83 RDW 27.3 Hep C neg Hep B Cab neg Hep B Sag neg B12 777 Ferritin 675 Folate 19.24 Iron 80 TIBC 268  AlkPhos 131 Cr 0.83  8/27/19 Cr 1 AST 15 ALT 18 Bili 0.3 WBC 5.1 RBC 5.32 Hg 10.1 MCV 60.9 RDW 30.2  Ferritin 48 Iron 44 TIBC 333  2/18/19 Ferritin 12 Hg electrophoresis Hg A2 1.7 Hg A1 52.2 Hg G 46.1%  1/30/19 FOBT neg  1/23/19 folic acid 4.9       Past Medical History:   Diagnosis Date    Anemia, unspecified     Essential (primary) hypertension        Past Surgical History:   Procedure Laterality Date    COLONOSCOPY  04/03/2019       Family History   Problem Relation Age of Onset    Hypertension Mother     Hypertension Father     Liver disease Father     Asthma Sister        Social History     Socioeconomic History    Marital status:     Tobacco Use    Smoking status: Never     Passive exposure: Never    Smokeless tobacco: Never   Substance and Sexual Activity    Alcohol use: Never    Drug use: Never    Sexual activity: Never       Current Outpatient Medications   Medication Sig Dispense Refill    albuterol (PROVENTIL/VENTOLIN HFA) 90 mcg/actuation inhaler Inhale 2 puffs into the lungs every 6 (six) hours as needed. Rescue      aspirin (ECOTRIN) 81 MG EC tablet Take 81 mg by mouth once daily.      busPIRone (BUSPAR) 7.5 MG tablet Take 7.5 mg by mouth 3 (three) times daily.      ferrous sulfate 325 (65 FE) MG EC tablet Take 1 tablet (325 mg total) by mouth once daily. Take on empty stomach. May eat an hour later. Take with vit C (glass orange juice or 500 mg Vit C tablets). 30 tablet 11    furosemide (LASIX) 40 MG tablet Take by mouth.      gabapentin (NEURONTIN) 300 MG capsule Take 300 mg by mouth 3 (three) times daily.      metoprolol succinate (TOPROL-XL) 100 MG 24 hr tablet Take 100 mg by mouth once daily.      pantoprazole (PROTONIX) 40 MG tablet Take 40 mg by mouth once daily.      spironolactone (ALDACTONE) 25 MG tablet Take 25 mg by mouth once daily.      sucralfate (CARAFATE) 1 gram tablet Take 1 tablet (1 g total) by mouth 4 (four) times daily. 120 tablet 0    vitamin D (VITAMIN D3) 1000 units Tab Take 1,000 Units by mouth once daily.      AMLODIPINE BESYLATE, BULK, MISC 10 mg by Misc.(Non-Drug; Combo Route) route once daily.      folic acid (FOLVITE) 1 MG tablet Take 1 tablet (1 mg total) by mouth once daily. (Patient not taking: Reported on 5/12/2023) 30 tablet 6     No current facility-administered medications for this visit.       Review of patient's allergies indicates:   Allergen Reactions    Lisinopril      Other reaction(s): angio adema           Radiology/Diagnostic Studies:  6/25/21 MMG benign  12/23/19 MMG screening: benign  Other imaging reviewed, nothing pertinent      Assessment/Plan:   1. Iron deficiency anemia due to  chronic blood loss D50.0 She had positive FOBT previously. Extensive workup as detailed above negative other than some thrombosed internal hemorrhoids, diverticulosis and possibly small erosions in terminal ileum. No active bleeding noted  Her prior iron indices are consistent with BAYLEE, though most recent one 10/2021 was WNL. She has no other source of blood loss  Started on oral iron in October 2022 due to symptoms of fatigue.       2. 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 Her low MCV is noted on labs dating to at least 2015, with RBC >5 million but with Hg ~10. 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 Folic acid deficiency E53.8 Previously noted to have low folic acid. Repeat folic acid level WNL. C/w folic acid 1mg daily      4. HTN (hypertension) I10 C/w BP medications as prescribed by PCP. Discussed that she needs to start cooking her food and avoid fast foods or food high in salt content. Has follow up with PCP next month  I also advised her that she needs repeat Pap smear. She will discuss with PCP. Will refer to GYN for abnormal pap  MMG 6/25/21 benign, repeat due in 1 year        PLAN:  Plan to follow-up in 4 months with repeat CBC, CMP, iron panel  Patient is scheduled for colonoscopy with Dr. Kitchen in the near future.  Will obtain records.    Patient will restart oral iron after completion of colonoscopy           This service was not originating from a related E/M service provided within the previous 7 days nor will  to an E/M service or procedure within the next 24 hours or my soonest available appointment.  Prevailing standard of care was able to be met in this audio-only visit.

## 2023-08-03 ENCOUNTER — HOSPITAL ENCOUNTER (EMERGENCY)
Facility: HOSPITAL | Age: 68
Discharge: HOME | End: 2023-08-03
Attending: STUDENT IN AN ORGANIZED HEALTH CARE EDUCATION/TRAINING PROGRAM
Payer: MEDICARE

## 2023-08-03 VITALS
OXYGEN SATURATION: 97 % | TEMPERATURE: 100 F | WEIGHT: 211 LBS | RESPIRATION RATE: 18 BRPM | HEART RATE: 75 BPM | SYSTOLIC BLOOD PRESSURE: 154 MMHG | DIASTOLIC BLOOD PRESSURE: 94 MMHG | HEIGHT: 62 IN | BODY MASS INDEX: 38.83 KG/M2

## 2023-08-03 DIAGNOSIS — I10 HYPERTENSION: ICD-10-CM

## 2023-08-03 DIAGNOSIS — G43.809 OTHER MIGRAINE WITHOUT STATUS MIGRAINOSUS, NOT INTRACTABLE: Primary | ICD-10-CM

## 2023-08-03 DIAGNOSIS — I10 POORLY-CONTROLLED HYPERTENSION: ICD-10-CM

## 2023-08-03 LAB
ADDITIONAL FINDINGS (OHS): ABNORMAL
ALBUMIN SERPL-MCNC: 3.8 G/DL (ref 3.4–4.8)
ALBUMIN/GLOB SERPL: 0.9 RATIO (ref 1.1–2)
ALP SERPL-CCNC: 109 UNIT/L (ref 40–150)
ALT SERPL-CCNC: 9 UNIT/L (ref 0–55)
ANISOCYTOSIS BLD QL SMEAR: ABNORMAL
AST SERPL-CCNC: 17 UNIT/L (ref 5–34)
BASOPHILS # BLD AUTO: 0.03 X10(3)/MCL
BASOPHILS NFR BLD AUTO: 0.3 %
BILIRUBIN DIRECT+TOT PNL SERPL-MCNC: 0.4 MG/DL
BUN SERPL-MCNC: 13.6 MG/DL (ref 9.8–20.1)
CALCIUM SERPL-MCNC: 10.2 MG/DL (ref 8.4–10.2)
CHLORIDE SERPL-SCNC: 102 MMOL/L (ref 98–107)
CO2 SERPL-SCNC: 27 MMOL/L (ref 23–31)
CREAT SERPL-MCNC: 1.13 MG/DL (ref 0.55–1.02)
EOSINOPHIL # BLD AUTO: 0.02 X10(3)/MCL (ref 0–0.9)
EOSINOPHIL NFR BLD AUTO: 0.2 %
ERYTHROCYTE [DISTWIDTH] IN BLOOD BY AUTOMATED COUNT: 22.1 % (ref 11.5–17)
FLUAV AG UPPER RESP QL IA.RAPID: NOT DETECTED
FLUBV AG UPPER RESP QL IA.RAPID: NOT DETECTED
GFR SERPLBLD CREATININE-BSD FMLA CKD-EPI: 54 MLS/MIN/1.73/M2
GLOBULIN SER-MCNC: 4.1 GM/DL (ref 2.4–3.5)
GLUCOSE SERPL-MCNC: 123 MG/DL (ref 82–115)
HCT VFR BLD AUTO: 37.3 % (ref 37–47)
HGB BLD-MCNC: 11.8 G/DL (ref 12–16)
IMM GRANULOCYTES # BLD AUTO: 0.03 X10(3)/MCL (ref 0–0.04)
IMM GRANULOCYTES NFR BLD AUTO: 0.3 %
LYMPHOCYTES # BLD AUTO: 1.39 X10(3)/MCL (ref 0.6–4.6)
LYMPHOCYTES NFR BLD AUTO: 12.5 %
MCH RBC QN AUTO: 20.6 PG (ref 27–31)
MCHC RBC AUTO-ENTMCNC: 31.6 G/DL (ref 33–36)
MCV RBC AUTO: 65.2 FL (ref 80–94)
MICROCYTES BLD QL SMEAR: ABNORMAL
MONOCYTES # BLD AUTO: 0.58 X10(3)/MCL (ref 0.1–1.3)
MONOCYTES NFR BLD AUTO: 5.2 %
NEUTROPHILS # BLD AUTO: 9.05 X10(3)/MCL (ref 2.1–9.2)
NEUTROPHILS NFR BLD AUTO: 81.5 %
PLATELET # BLD AUTO: 364 X10(3)/MCL (ref 130–400)
PLATELET # BLD EST: NORMAL 10*3/UL
PMV BLD AUTO: ABNORMAL FL
POC CARDIAC TROPONIN I: 0.01 NG/ML (ref 0–0.08)
POTASSIUM SERPL-SCNC: 4.6 MMOL/L (ref 3.5–5.1)
PROT SERPL-MCNC: 7.9 GM/DL (ref 5.8–7.6)
RBC # BLD AUTO: 5.72 X10(6)/MCL (ref 4.2–5.4)
RBC MORPH BLD: ABNORMAL
SAMPLE: NORMAL
SARS-COV-2 RNA RESP QL NAA+PROBE: NOT DETECTED
SODIUM SERPL-SCNC: 139 MMOL/L (ref 136–145)
WBC # SPEC AUTO: 11.1 X10(3)/MCL (ref 4.5–11.5)

## 2023-08-03 PROCEDURE — 85025 COMPLETE CBC W/AUTO DIFF WBC: CPT | Performed by: STUDENT IN AN ORGANIZED HEALTH CARE EDUCATION/TRAINING PROGRAM

## 2023-08-03 PROCEDURE — 99285 EMERGENCY DEPT VISIT HI MDM: CPT | Mod: 25

## 2023-08-03 PROCEDURE — 93005 ELECTROCARDIOGRAM TRACING: CPT

## 2023-08-03 PROCEDURE — 63600175 PHARM REV CODE 636 W HCPCS: Performed by: STUDENT IN AN ORGANIZED HEALTH CARE EDUCATION/TRAINING PROGRAM

## 2023-08-03 PROCEDURE — 93010 ELECTROCARDIOGRAM REPORT: CPT | Mod: ,,, | Performed by: INTERNAL MEDICINE

## 2023-08-03 PROCEDURE — 25000003 PHARM REV CODE 250: Performed by: STUDENT IN AN ORGANIZED HEALTH CARE EDUCATION/TRAINING PROGRAM

## 2023-08-03 PROCEDURE — 96374 THER/PROPH/DIAG INJ IV PUSH: CPT

## 2023-08-03 PROCEDURE — 96375 TX/PRO/DX INJ NEW DRUG ADDON: CPT

## 2023-08-03 PROCEDURE — 93010 EKG 12-LEAD: ICD-10-PCS | Mod: ,,, | Performed by: INTERNAL MEDICINE

## 2023-08-03 PROCEDURE — 96361 HYDRATE IV INFUSION ADD-ON: CPT

## 2023-08-03 PROCEDURE — 80053 COMPREHEN METABOLIC PANEL: CPT | Performed by: STUDENT IN AN ORGANIZED HEALTH CARE EDUCATION/TRAINING PROGRAM

## 2023-08-03 PROCEDURE — 0240U COVID/FLU A&B PCR: CPT | Performed by: STUDENT IN AN ORGANIZED HEALTH CARE EDUCATION/TRAINING PROGRAM

## 2023-08-03 RX ORDER — DIPHENHYDRAMINE HYDROCHLORIDE 50 MG/ML
12.5 INJECTION INTRAMUSCULAR; INTRAVENOUS
Status: COMPLETED | OUTPATIENT
Start: 2023-08-03 | End: 2023-08-03

## 2023-08-03 RX ORDER — LABETALOL HYDROCHLORIDE 5 MG/ML
10 INJECTION, SOLUTION INTRAVENOUS
Status: COMPLETED | OUTPATIENT
Start: 2023-08-03 | End: 2023-08-03

## 2023-08-03 RX ORDER — MORPHINE SULFATE 4 MG/ML
2 INJECTION, SOLUTION INTRAMUSCULAR; INTRAVENOUS
Status: COMPLETED | OUTPATIENT
Start: 2023-08-03 | End: 2023-08-03

## 2023-08-03 RX ORDER — SODIUM CHLORIDE 9 MG/ML
1000 INJECTION, SOLUTION INTRAVENOUS
Status: COMPLETED | OUTPATIENT
Start: 2023-08-03 | End: 2023-08-03

## 2023-08-03 RX ORDER — ONDANSETRON 2 MG/ML
4 INJECTION INTRAMUSCULAR; INTRAVENOUS
Status: COMPLETED | OUTPATIENT
Start: 2023-08-03 | End: 2023-08-03

## 2023-08-03 RX ORDER — MAG HYDROX/ALUMINUM HYD/SIMETH 200-200-20
30 SUSPENSION, ORAL (FINAL DOSE FORM) ORAL ONCE
Status: DISCONTINUED | OUTPATIENT
Start: 2023-08-03 | End: 2023-08-03

## 2023-08-03 RX ORDER — MAG HYDROX/ALUMINUM HYD/SIMETH 200-200-20
30 SUSPENSION, ORAL (FINAL DOSE FORM) ORAL ONCE
Status: COMPLETED | OUTPATIENT
Start: 2023-08-03 | End: 2023-08-03

## 2023-08-03 RX ADMIN — ONDANSETRON 4 MG: 2 INJECTION INTRAMUSCULAR; INTRAVENOUS at 02:08

## 2023-08-03 RX ADMIN — DIPHENHYDRAMINE HYDROCHLORIDE 12.5 MG: 50 INJECTION INTRAMUSCULAR; INTRAVENOUS at 02:08

## 2023-08-03 RX ADMIN — ALUMINUM HYDROXIDE, MAGNESIUM HYDROXIDE, AND SIMETHICONE 30 ML: 200; 200; 20 SUSPENSION ORAL at 03:08

## 2023-08-03 RX ADMIN — LABETALOL HYDROCHLORIDE 10 MG: 5 INJECTION, SOLUTION INTRAVENOUS at 03:08

## 2023-08-03 RX ADMIN — SODIUM CHLORIDE 1000 ML: 9 INJECTION, SOLUTION INTRAVENOUS at 02:08

## 2023-08-03 RX ADMIN — MORPHINE SULFATE 2 MG: 4 INJECTION INTRAVENOUS at 02:08

## 2023-08-03 NOTE — ED PROVIDER NOTES
Encounter Date: 8/3/2023       History     Chief Complaint   Patient presents with    Headache     Pt arrived to er via [facility] unit 028 c/o headaache, nausea, vomiting and weakness that started at 0400 yesterday. Pt compliant with bp meds.      Patient is a 68-year-old  female with a past medical history of hypertension, GERD who presented to the ER via EMS for headache.  Patient states the headaches been going on for the last 24 hours and states that it had an abrupt onset.  She denies any diplopia, dysarthria, dysphagia, focal deficits or changes in sensation.  Patient states it is not associated with nuchal rigidity fever, chills, cough, congestion, chest pain, shortness of breath or abdominal pain.  Patient states that she took gabapentin for without much relief.  She denies any other complaints.  She states she did have 1 episode of nonbloody nonbilious emesis prior to arrival.  She states that it is bifrontal in nature.      Review of patient's allergies indicates:   Allergen Reactions    Lisinopril      Other reaction(s): angio adema     Past Medical History:   Diagnosis Date    Anemia, unspecified     Essential (primary) hypertension      Past Surgical History:   Procedure Laterality Date    COLONOSCOPY  04/03/2019     Family History   Problem Relation Age of Onset    Hypertension Mother     Hypertension Father     Liver disease Father     Asthma Sister      Social History     Tobacco Use    Smoking status: Never     Passive exposure: Never    Smokeless tobacco: Never   Substance Use Topics    Alcohol use: Never    Drug use: Never     Review of Systems   Constitutional:  Negative for chills, fatigue and fever.   HENT:  Negative for congestion, sore throat and trouble swallowing.    Eyes:  Negative for pain and visual disturbance.   Respiratory:  Negative for cough, shortness of breath and wheezing.    Cardiovascular:  Negative for chest pain and palpitations.   Gastrointestinal:  Positive for  nausea and vomiting. Negative for abdominal pain, blood in stool, constipation and diarrhea.   Genitourinary:  Negative for dysuria and hematuria.   Musculoskeletal:  Negative for back pain and myalgias.   Skin:  Negative for rash and wound.   Neurological:  Positive for headaches. Negative for dizziness, tremors, seizures, syncope, facial asymmetry, speech difficulty, weakness, light-headedness and numbness.   Psychiatric/Behavioral:  Negative for confusion. The patient is not nervous/anxious.        Physical Exam     Initial Vitals [08/03/23 0209]   BP Pulse Resp Temp SpO2   (!) 184/102 76 18 99.7 °F (37.6 °C) 100 %      MAP       --         Physical Exam    Nursing note and vitals reviewed.  Constitutional: She appears well-developed and well-nourished. She is not diaphoretic. No distress.   HENT:   Head: Normocephalic.   Right Ear: External ear normal.   Left Ear: External ear normal.   Nose: Nose normal.   Eyes: Conjunctivae and EOM are normal. Right eye exhibits no discharge. Left eye exhibits no discharge. No scleral icterus.   Neck:   Normal range of motion.  Cardiovascular:  Normal rate, regular rhythm and normal heart sounds.     Exam reveals no gallop and no friction rub.       No murmur heard.  Pulmonary/Chest: Breath sounds normal. No stridor. No respiratory distress. She has no wheezes. She has no rhonchi. She has no rales.   Musculoskeletal:         General: Normal range of motion.      Cervical back: Normal range of motion.     Neurological: She is alert and oriented to person, place, and time. She has normal strength. No cranial nerve deficit or sensory deficit. GCS score is 15. GCS eye subscore is 4. GCS verbal subscore is 5. GCS motor subscore is 6.   CN II-XII intact bilaterally, PERRLA, EOMI, AO, no facial asymmetry noted, no abnormalities of vision loss or peripheral vision loss, strength 5/5 x 4 extremities, sensation intact throughout, no focal neurological deficits noted on exam.  Gait  stable without assistance. Negative Pronator drift bilaterally.       Skin: Skin is warm. No rash noted. No erythema.   Psychiatric: She has a normal mood and affect. Her behavior is normal.         ED Course   Procedures  Labs Reviewed   COMPREHENSIVE METABOLIC PANEL - Abnormal; Notable for the following components:       Result Value    Glucose Level 123 (*)     Creatinine 1.13 (*)     Protein Total 7.9 (*)     Globulin 4.1 (*)     Albumin/Globulin Ratio 0.9 (*)     All other components within normal limits   CBC WITH DIFFERENTIAL - Abnormal; Notable for the following components:    RBC 5.72 (*)     Hgb 11.8 (*)     MCV 65.2 (*)     MCH 20.6 (*)     MCHC 31.6 (*)     RDW 22.1 (*)     All other components within normal limits   BLOOD SMEAR MICROSCOPIC EXAM ([facility]) - Abnormal; Notable for the following components:    RBC Morph Abnormal (*)     Anisocytosis 1+ (*)     Microcytosis 1+ (*)     All other components within normal limits   COVID/FLU A&B PCR - Normal    Narrative:     The Xpert Xpress SARS-CoV-2/FLU/RSV plus is a rapid, multiplexed real-time PCR test intended for the simultaneous qualitative detection and differentiation of SARS-CoV-2, Influenza A, Influenza B, and respiratory syncytial virus (RSV) viral RNA in either nasopharyngeal swab or nasal swab specimens.         CBC W/ AUTO DIFFERENTIAL    Narrative:     The following orders were created for panel order CBC Auto Differential.  Procedure                               Abnormality         Status                     ---------                               -----------         ------                     CBC with Differential[[id number]]        Abnormal            Final result                 Please view results for these tests on the individual orders.   TROPONIN ISTAT   POCT TROPONIN     EKG Readings: (Independently Interpreted)   Initial Reading: No STEMI. Rhythm: Normal Sinus Rhythm. Heart Rate: 67. Ectopy: No Ectopy. Conduction: Normal. ST Segments:  Normal ST Segments. T Waves: Normal. Axis: Normal.     ECG Results              EKG 12-lead (In process)  Result time 08/03/23 02:35:06      In process by Interface, Lab [facility] (08/03/23 02:35:06)                   Narrative:    Test Reason : I10,    Vent. Rate : 067 BPM     Atrial Rate : 067 BPM     P-R Int : 162 ms          QRS Dur : 076 ms      QT Int : 394 ms       P-R-T Axes : 032 022 060 degrees     QTc Int : 416 ms    Normal sinus rhythm  Normal ECG  No previous ECGs available    Referred By:             Confirmed By:                                   Imaging Results              CT Head Without Contrast (Preliminary result)  Result time 08/03/23 02:54:45      Preliminary result by Ambrocio Sandhu Jr., MD (08/03/23 02:54:45)                   Narrative:    START OF REPORT:  TECHNIQUE: CT OF THE HEAD WAS PERFORMED WITHOUT INTRAVENOUS CONTRAST WITH AXIAL AS WELL AS CORONAL AND SAGITTAL IMAGES.    COMPARISON: NONE.    DOSAGE INFORMATION: AUTOMATED EXPOSURE CONTROL WAS UTILIZED.    CLINICAL HISTORY: C/O HEADAACHE, NAUSEA, VOMITING AND WEAKNESS THAT STARTED AT 0400 YESTERDAY.    Findings:  Hemorrhage: No acute intracranial hemorrhage is seen.  CSF spaces: The ventricles, sulci and basal cisterns all appear mildly prominent consistent with global cerebral atrophy.  Brain parenchyma: There is preservation of the grey white junction throughout. Moderate microvascular change is seen in portions of the periventricular and deep white matter tracts.  Cerebellum: Unremarkable.  Sella and skull base: The sella appears to be within normal limits for age.  Herniation: None.  Intracranial calcifications: Incidental note is made of bilateral choroid plexus calcification. Incidental note is made of some pineal region calcification. Incidental note is made of some calcification of the falx. Incidental note is made of subtle bilateral basal ganglia calcifcation.  Calvarium: No acute linear or depressed skull fracture is  seen.    Maxillofacial Structures:  Paranasal sinuses: Small retention cysts or polyps are seen in bilateral maxillary sinuses.  Orbits: The orbits appear unremarkable.  Zygomatic arches: The zygomatic arches are intact and unremarkable.  Temporal bones and mastoids: The temporal bones and mastoids appear unremarkable.  TMJ: The mandibular condyles appear normally placed with respect to the mandibular fossa.      Impression:  1. No acute intracranial process identified. Details and findings as noted above.                                         X-Ray Chest 1 View (In process)                   X-Rays:   Independently Interpreted Readings:   Chest X-Ray: Normal heart size.  No infiltrates.  No acute abnormalities. Compared to x-ray in past largely unchanged     Medications   0.9%  NaCl infusion (0 mLs Intravenous Stopped 8/3/23 0349)   diphenhydrAMINE injection 12.5 mg (12.5 mg Intravenous Given 8/3/23 0233)   morphine injection 2 mg (2 mg Intravenous Given 8/3/23 0234)   ondansetron injection 4 mg (4 mg Intravenous Given 8/3/23 0235)   aluminum-magnesium hydroxide-simethicone 200-200-20 mg/5 mL suspension 30 mL (30 mLs Oral Given 8/3/23 0301)   labetaloL injection 10 mg (10 mg Intravenous Given 8/3/23 0350)     Medical Decision Making:   Initial Assessment:   Distress secondary to nausea and pain on arrival 68-year-old female EKG unremarkable  Differential Diagnosis:   Hypertensive urgency, hypertensive emergency, migraine induced hypertension, COVID, flu, uncontrolled hypertension  Clinical Tests:   Lab Tests: Ordered and Reviewed  Radiological Study: Ordered and Reviewed  Medical Tests: Ordered and Reviewed  ED Management:  Hypertensive on arrival 180/100   Per chart review it appears patient has been higher this on 12/27/2022 and 09/20/2021   Neuro exam is unremarkable EKG showed no ischemic changes   Morphine, Benadryl, Zofran given here in the emergency room as patient has a ride  Patient states headache  resolved blood pressure remained   Ten IV labetalol given and blood pressure improved approximately 20% improved down to 160/90  CT head was unremarkable basic labs reassuring troponin was negative   Advised patient to follow up with the PCP in regards to the better antihypertensive regimen and she voiced understanding  Patient has a ride home with her son   All questions answered in layman's terms and return precautions were discussed                               Clinical Impression:   Final diagnoses:  [I10] Hypertension  [G43.809] Other migraine without status migrainosus, not intractable (Primary)  [I10] Poorly-controlled hypertension        ED Disposition Condition    Discharge Stable          ED Prescriptions    None       Follow-up Information       Follow up With Specialties Details Why Contact Info    [facility] - Emergency Dept Emergency Medicine  If symptoms worsen [address] [facility] [address] [phone]    Mallory Chavez NP Family Medicine Schedule an appointment as soon as possible for a visit   [address] [place] [id number] [phone]               Blayne Marie MD  08/03/23 0419

## 2023-10-10 DIAGNOSIS — D50.9 IRON DEFICIENCY ANEMIA, UNSPECIFIED IRON DEFICIENCY ANEMIA TYPE: Primary | ICD-10-CM

## 2023-10-18 ENCOUNTER — OFFICE VISIT (OUTPATIENT)
Dept: HEMATOLOGY/ONCOLOGY | Facility: CLINIC | Age: 68
End: 2023-10-18
Payer: MEDICARE

## 2023-10-18 VITALS
HEIGHT: 62 IN | DIASTOLIC BLOOD PRESSURE: 92 MMHG | SYSTOLIC BLOOD PRESSURE: 133 MMHG | HEART RATE: 75 BPM | WEIGHT: 204.31 LBS | RESPIRATION RATE: 20 BRPM | TEMPERATURE: 98 F | BODY MASS INDEX: 37.6 KG/M2 | OXYGEN SATURATION: 98 %

## 2023-10-18 DIAGNOSIS — D50.9 IRON DEFICIENCY ANEMIA, UNSPECIFIED IRON DEFICIENCY ANEMIA TYPE: Primary | ICD-10-CM

## 2023-10-18 PROCEDURE — 3008F BODY MASS INDEX DOCD: CPT | Mod: CPTII,,,

## 2023-10-18 PROCEDURE — 3080F DIAST BP >= 90 MM HG: CPT | Mod: CPTII,,,

## 2023-10-18 PROCEDURE — 1101F PR PT FALLS ASSESS DOC 0-1 FALLS W/OUT INJ PAST YR: ICD-10-PCS | Mod: CPTII,,,

## 2023-10-18 PROCEDURE — 3008F PR BODY MASS INDEX (BMI) DOCUMENTED: ICD-10-PCS | Mod: CPTII,,,

## 2023-10-18 PROCEDURE — 3080F PR MOST RECENT DIASTOLIC BLOOD PRESSURE >= 90 MM HG: ICD-10-PCS | Mod: CPTII,,,

## 2023-10-18 PROCEDURE — 99214 OFFICE O/P EST MOD 30 MIN: CPT | Mod: ,,,

## 2023-10-18 PROCEDURE — 3288F FALL RISK ASSESSMENT DOCD: CPT | Mod: CPTII,,,

## 2023-10-18 PROCEDURE — 3075F SYST BP GE 130 - 139MM HG: CPT | Mod: CPTII,,,

## 2023-10-18 PROCEDURE — 1159F PR MEDICATION LIST DOCUMENTED IN MEDICAL RECORD: ICD-10-PCS | Mod: CPTII,,,

## 2023-10-18 PROCEDURE — 99214 PR OFFICE/OUTPT VISIT, EST, LEVL IV, 30-39 MIN: ICD-10-PCS | Mod: ,,,

## 2023-10-18 PROCEDURE — 1101F PT FALLS ASSESS-DOCD LE1/YR: CPT | Mod: CPTII,,,

## 2023-10-18 PROCEDURE — 1159F MED LIST DOCD IN RCRD: CPT | Mod: CPTII,,,

## 2023-10-18 PROCEDURE — 3288F PR FALLS RISK ASSESSMENT DOCUMENTED: ICD-10-PCS | Mod: CPTII,,,

## 2023-10-18 PROCEDURE — 3075F PR MOST RECENT SYSTOLIC BLOOD PRESS GE 130-139MM HG: ICD-10-PCS | Mod: CPTII,,,

## 2023-10-18 RX ORDER — TRAZODONE HYDROCHLORIDE 50 MG/1
50 TABLET ORAL NIGHTLY
COMMUNITY
Start: 2023-10-17

## 2023-10-18 RX ORDER — ESCITALOPRAM OXALATE 10 MG/1
TABLET ORAL
COMMUNITY
Start: 2023-08-01

## 2023-10-18 RX ORDER — NIFEDIPINE 60 MG/1
1 TABLET, EXTENDED RELEASE ORAL EVERY MORNING
COMMUNITY

## 2023-10-18 RX ORDER — PROMETHAZINE HYDROCHLORIDE 12.5 MG/1
12.5 TABLET ORAL
COMMUNITY
Start: 2023-04-27

## 2023-10-18 RX ORDER — FERROUS SULFATE TAB 325 MG (65 MG ELEMENTAL FE) 325 (65 FE) MG
TAB ORAL
COMMUNITY
Start: 2023-05-12

## 2023-10-18 RX ORDER — AMOXICILLIN AND CLAVULANATE POTASSIUM 875; 125 MG/1; MG/1
1 TABLET, FILM COATED ORAL 2 TIMES DAILY
COMMUNITY

## 2023-10-18 NOTE — PROGRESS NOTES
HPI:     65 y/o F w/ PMHx of HTN, HLD, anemia, GERD, depression referred to Hematology at [facility] for anemia    EGD 4/2/19 unremarkable. FOBT at same time positive  Colonoscopy 4/3/19 found nonthrombosed internal hemorrhoids, diverticulosis in sigmoid and descending colon but no clear source of blood loss.  Nuclear medicine bleeding scan showed equivocal focus of GI bleeding in mid small bowel loops in left lower quadrant  M2A capsule endoscopy showed a few small erosions in terminal ileum but otherwise no gross blood loss in whole small bowel.          Interval History:  Patient had symptoms of GI blood loss with dark tarry stools in December 2023 for which she was seen in the ER.  Patient was discharged from the ER with follow-up with genetic Gastroenterology outpatient.  She had a EGD in February 2023 which was within normal limits per patient report.  She is scheduled for colonoscopy in the near future.  She was on oral iron in the past but is asked to hold off on it until the colonoscopies completed.  Patient denies any symptoms of fatigue, chest pain, dizziness, lightheadedness or shortness of breath.    Today 10/18/23: pt here today for follow up visit. She is doing well overall. She is compliant with oral folic acid and iron daily as directed.  She does report dizziness but relates this changes in lasix. She is being managed by her PCP. She cancelled colonoscopy due to high deductible cost. Her PCP has ordered follow-up MMG and PAP smear. She is awaiting appt. She denies n/v/c/d, denies melena or hematochezia. Denies SOB, fatigue, CP, ORTEGA, fevers, chills. Energy level stable. No other complaints or concerns reported.             Labs:    10/14/23: cr 0.87, alb 4.5, ca 10.0, iron 144, ironsat 39%, ferritin 35, wbc 7.0, hgb 11.7, plt 320, ANC 4.8  04/25/2023:  WBC count 5.9, hemoglobin 11, MCV 64, platelet count 400, Albumin 3.9, calcium 10.2, bilirubin 0.3, alkaline phosphatase 103, AST 9, ALT 8, ferritin  21, iron 60, % saturation 15, TIBC 398,   4/12/22  B12 341 WBC 6.3 Hg 11.1 HCT 33.4 ,MCV 66 RDW 22.2  ANC 4.39 Cr 1.18 Alb 3.8 Ferritin 84 Iron 44 TIBC 276  10/6/21  Folate 10.9 B12 455 WBC 5.1 RBC 5.29 Hg 11.4 MCV 69.4 RDW 21.8  ANC 3.26 Cr 1.07 Alb 4.1 TP 6.8 Ferritin 122 Iron 56 TIBC 273 Retic Hg 25.4  6/15/21 Cr 1.10, Alb 4.0, TP 6.8, Vit D 34, WBC 6.8, Hgb 11.6, MCV 67.3, RDW 22.2, , ANC 4.78, TSH 1.60, Ferritin 186, iron 51. TIBC 268, B12 578, folic acid >20  10/19/20 Cr 0.72, TP 6.3, albumin 3.9, WBC 5.1, Hg 11.4, RBC 5.26, MCV 68.6, RDW 20.6, , ANC 3.16, TSH 1.63, T4 7.6, ferritin 255, serum iron 92, TIBC 239, iron sat 38%, folic acid 17.8, retic 1.6  6/9/20 WBC 5.6, Hgb 12.2, Hct 38.9, MCV 69.1, MCH 21.7, RDW 19.7  2/26/20 Hgb 13.0, Iron 69, TIBC 256, Ferritin 456, Folic acid 34.69, B12 626  11/5/19 -a/-a homozygous Hg electrophoresis A 55.2% Other 44.5%  WBC 6.7 Hg 12.5  MCV 66.9 RBC 5.83 RDW 27.3 Hep C neg Hep B Cab neg Hep B Sag neg B12 777 Ferritin 675 Folate 19.24 Iron 80 TIBC 268  AlkPhos 131 Cr 0.83  8/27/19 Cr 1 AST 15 ALT 18 Bili 0.3 WBC 5.1 RBC 5.32 Hg 10.1 MCV 60.9 RDW 30.2  Ferritin 48 Iron 44 TIBC 333  2/18/19 Ferritin 12 Hg electrophoresis Hg A2 1.7 Hg A1 52.2 Hg G 46.1%  1/30/19 FOBT neg  1/23/19 folic acid 4.9       Past Medical History:   Diagnosis Date    Anemia, unspecified     Essential (primary) hypertension        Past Surgical History:   Procedure Laterality Date    COLONOSCOPY  04/03/2019    ESOPHAGOGASTRODUODENOSCOPY  2023       Family History   Problem Relation Age of Onset    Hypertension Mother     Hypertension Father     Liver disease Father     Asthma Sister        Social History     Socioeconomic History    Marital status:    Tobacco Use    Smoking status: Never     Passive exposure: Never    Smokeless tobacco: Never   Substance and Sexual Activity    Alcohol use: Never    Drug use: Never    Sexual  activity: Never       Current Outpatient Medications   Medication Sig Dispense Refill    albuterol (PROVENTIL/VENTOLIN HFA) 90 mcg/actuation inhaler Inhale 2 puffs into the lungs every 6 (six) hours as needed. Rescue      amoxicillin-clavulanate 875-125mg (AUGMENTIN) 875-125 mg per tablet Take 1 tablet by mouth 2 (two) times daily.      EScitalopram oxalate (LEXAPRO) 10 MG tablet TAKE ONE TABLET BY MOUTH ONCE A DAY FOR ANXIETY      FEROSUL 325 mg (65 mg iron) Tab tablet TAKE ONE TABLET BY MOUTH ONCE DAILY ON AN EMPTY STOMACH WITH A GLASS OF ORANGE JUICE OR 500MG VITAMIN C TABLET. MAY EAT AN HOUR LATER      folic acid (FOLVITE) 1 MG tablet Take 1 tablet (1 mg total) by mouth once daily. 30 tablet 6    furosemide (LASIX) 40 MG tablet Take by mouth.      gabapentin (NEURONTIN) 300 MG capsule Take 300 mg by mouth 3 (three) times daily.      metoprolol succinate (TOPROL-XL) 100 MG 24 hr tablet Take 100 mg by mouth once daily.      NIFEdipine (PROCARDIA-XL) 60 MG (OSM) 24 hr tablet Take 1 tablet by mouth every morning.      pantoprazole (PROTONIX) 40 MG tablet Take 40 mg by mouth once daily.      promethazine (PHENERGAN) 12.5 MG Tab Take 12.5 mg by mouth.      spironolactone (ALDACTONE) 25 MG tablet Take 25 mg by mouth once daily.      traZODone (DESYREL) 50 MG tablet Take 50 mg by mouth every evening.      AMLODIPINE BESYLATE, BULK, MISC 10 mg by Misc.(Non-Drug; Combo Route) route once daily.      aspirin (ECOTRIN) 81 MG EC tablet Take 81 mg by mouth once daily.      busPIRone (BUSPAR) 7.5 MG tablet Take 7.5 mg by mouth 3 (three) times daily.      ferrous sulfate 325 (65 FE) MG EC tablet Take 1 tablet (325 mg total) by mouth once daily. Take on empty stomach. May eat an hour later. Take with vit C (glass orange juice or 500 mg Vit C tablets). (Patient not taking: Reported on 10/18/2023) 30 tablet 11    PROCARDIA XL 60 mg 24 hr tablet Take 60 mg by mouth.      sucralfate (CARAFATE) 1 gram tablet Take 1 tablet (1 g total)  by mouth 4 (four) times daily. (Patient not taking: Reported on 10/18/2023) 120 tablet 0    vitamin D (VITAMIN D3) 1000 units Tab Take 1,000 Units by mouth once daily.       No current facility-administered medications for this visit.       Review of patient's allergies indicates:   Allergen Reactions    Lisinopril      Other reaction(s): angio adema    Aspirin Nausea Only     Other Reaction(s): Not Indicated             Radiology/Diagnostic Studies:  6/25/21 MMG benign  12/23/19 MMG screening: benign  Other imaging reviewed, nothing pertinent      Assessment/Plan:   1. Iron deficiency anemia due to chronic blood loss D50.0 She had positive FOBT previously. Extensive workup as detailed above negative other than some thrombosed internal hemorrhoids, diverticulosis and possibly small erosions in terminal ileum. No active bleeding noted  Her prior iron indices are consistent with BAYLEE, though most recent one 10/2021 was WNL. She has no other source of blood loss  Started on oral iron in October 2022 due to symptoms of fatigue.       2. 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 Her low MCV is noted on labs dating to at least 2015, with RBC >5 million but with Hg ~10. 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 Folic acid deficiency E53.8 Previously noted to have low folic acid. Repeat folic acid level WNL. C/w folic acid 1mg daily      4. HTN (hypertension) I10 C/w BP medications as prescribed by PCP. Discussed that she needs to start cooking her food and avoid fast foods or food high in salt content. Has follow up with PCP next month  I also advised her that she needs repeat Pap smear. She will discuss with PCP. Will refer to GYN for abnormal pap  MMG 6/25/21 benign, repeat  due in 1 year        PLAN:  Labs reviewed normal iron and hgb level.   Continue folic acid and oral iron daily  Recommend she reschedule colonoscopy  Follow-up with MMG and PAP smear per PCP

## 2023-11-08 ENCOUNTER — HOSPITAL ENCOUNTER (EMERGENCY)
Facility: HOSPITAL | Age: 68
Discharge: HOME | End: 2023-11-09
Attending: SPECIALIST
Payer: MEDICARE

## 2023-11-08 VITALS
HEIGHT: 62 IN | TEMPERATURE: 100 F | BODY MASS INDEX: 38.09 KG/M2 | DIASTOLIC BLOOD PRESSURE: 90 MMHG | RESPIRATION RATE: 18 BRPM | WEIGHT: 207 LBS | SYSTOLIC BLOOD PRESSURE: 149 MMHG | HEART RATE: 85 BPM | OXYGEN SATURATION: 96 %

## 2023-11-08 DIAGNOSIS — J10.1 INFLUENZA A: Primary | ICD-10-CM

## 2023-11-08 DIAGNOSIS — R03.0 ELEVATED BLOOD PRESSURE READING: ICD-10-CM

## 2023-11-08 LAB
FLUAV AG UPPER RESP QL IA.RAPID: DETECTED
FLUBV AG UPPER RESP QL IA.RAPID: NOT DETECTED
SARS-COV-2 RNA RESP QL NAA+PROBE: NOT DETECTED

## 2023-11-08 PROCEDURE — 0240U COVID/FLU A&B PCR: CPT | Performed by: SPECIALIST

## 2023-11-08 PROCEDURE — 99283 EMERGENCY DEPT VISIT LOW MDM: CPT

## 2023-11-08 PROCEDURE — 25000003 PHARM REV CODE 250: Performed by: SPECIALIST

## 2023-11-08 RX ORDER — ACETAMINOPHEN 500 MG
1000 TABLET ORAL
Status: COMPLETED | OUTPATIENT
Start: 2023-11-08 | End: 2023-11-08

## 2023-11-08 RX ORDER — OSELTAMIVIR PHOSPHATE 75 MG/1
75 CAPSULE ORAL
Status: COMPLETED | OUTPATIENT
Start: 2023-11-08 | End: 2023-11-08

## 2023-11-08 RX ORDER — OSELTAMIVIR PHOSPHATE 75 MG/1
75 CAPSULE ORAL 2 TIMES DAILY
Qty: 10 CAPSULE | Refills: 0 | Status: SHIPPED | OUTPATIENT
Start: 2023-11-08 | End: 2023-11-13

## 2023-11-08 RX ADMIN — OSELTAMIVIR PHOSPHATE 75 MG: 75 CAPSULE ORAL at 11:11

## 2023-11-08 RX ADMIN — ACETAMINOPHEN 1000 MG: 500 TABLET ORAL at 09:11

## 2023-11-09 NOTE — ED PROVIDER NOTES
Encounter Date: 11/8/2023       History     Chief Complaint   Patient presents with    Headache     Patient c/o severe headache all day, nausea, body aches, and she reports it feels like she is hallucinating. She reports dry cough started this morning. EMS administered zofran 4mg IM prior to arrival. She reports that it feels like she dying. Patient confused and hallucinating in triage     Patient presents with dry cough, fever, headache, body aches and nausea most of the day    The history is provided by the patient.     Review of patient's allergies indicates:   Allergen Reactions    Lisinopril      Other reaction(s): angio adema    Aspirin Nausea Only     Other Reaction(s): Not Indicated       Past Medical History:   Diagnosis Date    Anemia, unspecified     Essential (primary) hypertension      Past Surgical History:   Procedure Laterality Date    COLONOSCOPY  04/03/2019    ESOPHAGOGASTRODUODENOSCOPY  2023     Family History   Problem Relation Age of Onset    Hypertension Mother     Hypertension Father     Liver disease Father     Asthma Sister      Social History     Tobacco Use    Smoking status: Never     Passive exposure: Never    Smokeless tobacco: Never   Substance Use Topics    Alcohol use: Never    Drug use: Never     Review of Systems   Constitutional: Negative.    HENT: Negative.     Respiratory: Negative.     Cardiovascular: Negative.    Gastrointestinal: Negative.    Musculoskeletal: Negative.    Skin: Negative.    Neurological: Negative.    All other systems reviewed and are negative.      Physical Exam     Initial Vitals [11/08/23 2134]   BP Pulse Resp Temp SpO2   (!) 171/103 89 16 (!) 101.3 °F (38.5 °C) (!) 92 %      MAP       --         Physical Exam    Nursing note and vitals reviewed.  Constitutional: She appears well-developed and well-nourished.   HENT:   Head: Normocephalic and atraumatic.   Mouth/Throat: Oropharynx is clear and moist.   Eyes: EOM are normal. Pupils are equal, round, and  reactive to light.   Neck: Neck supple.   Normal range of motion.  Cardiovascular:  Normal rate, regular rhythm, normal heart sounds and intact distal pulses.           Pulmonary/Chest: Breath sounds normal. She has no wheezes.   Abdominal: Abdomen is soft. She exhibits no distension. There is no abdominal tenderness.   Musculoskeletal:         General: Normal range of motion.      Cervical back: Normal range of motion and neck supple.     Neurological: She is alert and oriented to person, place, and time. She has normal strength. GCS score is 15. GCS eye subscore is 4. GCS verbal subscore is 5. GCS motor subscore is 6.   Skin: Skin is warm and dry.         ED Course   Procedures  Labs Reviewed   COVID/FLU A&B PCR - Abnormal; Notable for the following components:       Result Value    Influenza A PCR Detected (*)     All other components within normal limits    Narrative:     The Xpert Xpress SARS-CoV-2/FLU/RSV plus is a rapid, multiplexed real-time PCR test intended for the simultaneous qualitative detection and differentiation of SARS-CoV-2, Influenza A, Influenza B, and respiratory syncytial virus (RSV) viral RNA in either nasopharyngeal swab or nasal swab specimens.                Imaging Results    None          Medications   acetaminophen tablet 1,000 mg (1,000 mg Oral Given 11/8/23 2139)   oseltamivir capsule 75 mg (75 mg Oral Given 11/8/23 2308)     Medical Decision Making  Patient presents with dry cough, fever, headache, body aches and nausea most of the day    DIFFERENTIAL DIAGNOSIS- influenza, COVID, viral syndrome    Amount and/or Complexity of Data Reviewed  Labs: ordered. Decision-making details documented in ED Course.    Risk  OTC drugs.  Prescription drug management.  Risk Details: Patient given Tylenol 1000 mg p.o.; her influenza a test was positive and she was given Tamiflu 75 mg in the emergency room and a prescription for twice a day for 5 days was sent to her pharmacy; encouraged adequate  "hydration and fever control               ED Course as of 11/08/23 2336 Wed Nov 08, 2023   2250 Influenza A, Molecular(!): Detected [DD]      ED Course User Index  [DD] Hernandez Garcia MD          Patient Vitals for the past 24 hrs:   BP Temp Temp src Pulse Resp SpO2 Height Weight   11/08/23 2315 (!) 149/90 -- -- 85 18 96 % -- --   11/08/23 2249 -- 100 °F (37.8 °C) -- -- -- -- -- --   11/08/23 2212 (!) 162/97 -- -- -- -- -- -- --   11/08/23 2140 -- -- -- 95 20 96 % -- --   11/08/23 2134 (!) 171/103 (!) 101.3 °F (38.5 °C) Oral 89 16 (!) 92 % 5' 2" (1.575 m) 93.9 kg (207 lb)        The patient is resting comfortably and in no acute distress.  She states that her symptoms have improved after treatment in Emergency Department. I personally discussed her test results and treatment plan.  Gave strict ED precautions.  Specific conditions for return to the emergency department and importance of follow up with her primary care provided or the physician listed on the discharge instructions.  Patient voices understanding and agrees to the plan discussed. All patients' questions have been answered at this time.   She has remained hemodynamically stable throughout entire stay in ED and is stable for discharge home.        Clinical Impression:   Final diagnoses:  [J10.1] Influenza A (Primary)  [R03.0] Elevated blood pressure reading        ED Disposition Condition    Discharge Stable          ED Prescriptions       Medication Sig Dispense Start Date End Date Auth. Provider    oseltamivir (TAMIFLU) 75 MG capsule Take 1 capsule (75 mg total) by mouth 2 (two) times daily. for 5 days 10 capsule 11/8/2023 11/13/2023 Hernandez Garcia MD          Follow-up Information       Follow up With Specialties Details Why Contact Info    Mallory Chavez NP Family Medicine In 1 week  [address] [place] [id number]  [phone]               Hernandez Garcia MD  11/08/23 2336    "

## 2023-11-09 NOTE — ED NOTES
11/9/2023 0240 patient's daughter text her saying that she is not coming to pick her up. She reports that she have 4 kids at home and patient should have not came to the ED. Patient contacted her son and he does not have a vehicle to pick her up. Patient reports that she has no way to get home. Pretty with registration will call for taxi for patient.

## 2023-11-09 NOTE — ED NOTES
"Pt presents with  headache/bodyaches-  "feel like  I an dying" - nausea and reports she is seeign a man that is telling her she is goinf to die- pt denies any sick contacts- is oriented to place/person/situation.   Reprots never had the flu or fever in her life.  Bbs cta/ [id number]. Abd soft reports lower abd pain only when she coughs.  "

## 2024-04-22 DIAGNOSIS — D50.9 IRON DEFICIENCY ANEMIA, UNSPECIFIED IRON DEFICIENCY ANEMIA TYPE: ICD-10-CM

## 2024-04-22 DIAGNOSIS — E53.8 FOLIC ACID DEFICIENCY: ICD-10-CM

## 2024-04-23 RX ORDER — FOLIC ACID 1 MG/1
1000 TABLET ORAL
Qty: 30 TABLET | Refills: 6 | Status: SHIPPED | OUTPATIENT
Start: 2024-04-23

## 2024-05-29 ENCOUNTER — OFFICE VISIT (OUTPATIENT)
Dept: HEMATOLOGY/ONCOLOGY | Facility: CLINIC | Age: 69
End: 2024-05-29
Payer: MEDICARE

## 2024-05-29 DIAGNOSIS — E53.8 FOLIC ACID DEFICIENCY: ICD-10-CM

## 2024-05-29 DIAGNOSIS — D50.9 IRON DEFICIENCY ANEMIA, UNSPECIFIED IRON DEFICIENCY ANEMIA TYPE: Primary | ICD-10-CM

## 2024-05-29 PROCEDURE — 1101F PT FALLS ASSESS-DOCD LE1/YR: CPT | Mod: CPTII,95,,

## 2024-05-29 PROCEDURE — 1126F AMNT PAIN NOTED NONE PRSNT: CPT | Mod: CPTII,95,,

## 2024-05-29 PROCEDURE — 99442 PR PHYSICIAN TELEPHONE EVALUATION 11-20 MIN: CPT | Mod: 95,,,

## 2024-05-29 PROCEDURE — 3288F FALL RISK ASSESSMENT DOCD: CPT | Mod: CPTII,95,,

## 2024-05-29 RX ORDER — FERROUS SULFATE 325(65) MG
325 TABLET, DELAYED RELEASE (ENTERIC COATED) ORAL DAILY
Qty: 30 TABLET | Refills: 11 | Status: SHIPPED | OUTPATIENT
Start: 2024-05-29

## 2024-05-29 NOTE — PROGRESS NOTES
Established Patient - Audio Only Telehealth Visit     The patient location is: Home  The chief complaint leading to consultation is: BAYLEE  Visit type: Virtual visit with audio only (telephone)  Total time spent with patient: 15 minutes       The reason for the audio only service rather than synchronous audio and video virtual visit was related to technical difficulties or patient preference/necessity.     Each patient to whom I provide medical services by telemedicine is:  (1) informed of the relationship between the physician and patient and the respective role of any other health care provider with respect to management of the patient; and (2) notified that they may decline to receive medical services by telemedicine and may withdraw from such care at any time. Patient verbally consented to receive this service via voice-only telephone call.       HPI:     67 y/o F w/ PMHx of HTN, HLD, anemia, GERD, depression referred to Hematology at [facility] for anemia    EGD 4/2/19 unremarkable. FOBT at same time positive  Colonoscopy 4/3/19 found nonthrombosed internal hemorrhoids, diverticulosis in sigmoid and descending colon but no clear source of blood loss.  Nuclear medicine bleeding scan showed equivocal focus of GI bleeding in mid small bowel loops in left lower quadrant  M2A capsule endoscopy showed a few small erosions in terminal ileum but otherwise no gross blood loss in whole small bowel.          Interval History:  Patient had symptoms of GI blood loss with dark tarry stools in December 2023 for which she was seen in the ER.  Patient was discharged from the ER with follow-up with genetic Gastroenterology outpatient.  She had a EGD in February 2023 which was within normal limits per patient report.  She is scheduled for colonoscopy in the near future.  She was on oral iron in the past but is asked to hold off on it until the colonoscopies completed.  Patient denies any symptoms of fatigue, chest pain, dizziness,  lightheadedness or shortness of breath.    Today 05/29/24: pt via telephone for follow up visit. She is doing well overall. She is compliant with oral folic acid but has stopped oral iron due to constipation. Her PCP manages follow-up MMG and PAP smear.  She denies n/v/c/d, denies melena or hematochezia. Denies SOB, fatigue, CP, ORTEGA, fevers, chills. Energy level stable. No other complaints or concerns reported.             Labs:    05/25/24: cr 1.67, alb 3.9, ca 9.9, iron 46, ironsat 14%, ferritin 39, wbc 6.2, hgb 11.7, plt 295, ANC 4.93  10/14/23: cr 0.87, alb 4.5, ca 10.0, iron 144, ironsat 39%, ferritin 35, wbc 7.0, hgb 11.7, plt 320, ANC 4.8  04/25/2023:  WBC count 5.9, hemoglobin 11, MCV 64, platelet count 400, Albumin 3.9, calcium 10.2, bilirubin 0.3, alkaline phosphatase 103, AST 9, ALT 8, ferritin 21, iron 60, % saturation 15, TIBC 398,   4/12/22  B12 341 WBC 6.3 Hg 11.1 HCT 33.4 ,MCV 66 RDW 22.2  ANC 4.39 Cr 1.18 Alb 3.8 Ferritin 84 Iron 44 TIBC 276  10/6/21  Folate 10.9 B12 455 WBC 5.1 RBC 5.29 Hg 11.4 MCV 69.4 RDW 21.8  ANC 3.26 Cr 1.07 Alb 4.1 TP 6.8 Ferritin 122 Iron 56 TIBC 273 Retic Hg 25.4  6/15/21 Cr 1.10, Alb 4.0, TP 6.8, Vit D 34, WBC 6.8, Hgb 11.6, MCV 67.3, RDW 22.2, , ANC 4.78, TSH 1.60, Ferritin 186, iron 51. TIBC 268, B12 578, folic acid >20  10/19/20 Cr 0.72, TP 6.3, albumin 3.9, WBC 5.1, Hg 11.4, RBC 5.26, MCV 68.6, RDW 20.6, , ANC 3.16, TSH 1.63, T4 7.6, ferritin 255, serum iron 92, TIBC 239, iron sat 38%, folic acid 17.8, retic 1.6  6/9/20 WBC 5.6, Hgb 12.2, Hct 38.9, MCV 69.1, MCH 21.7, RDW 19.7  2/26/20 Hgb 13.0, Iron 69, TIBC 256, Ferritin 456, Folic acid 34.69, B12 626  11/5/19 -a/-a homozygous Hg electrophoresis A 55.2% Other 44.5%  WBC 6.7 Hg 12.5  MCV 66.9 RBC 5.83 RDW 27.3 Hep C neg Hep B Cab neg Hep B Sag neg B12 777 Ferritin 675 Folate 19.24 Iron 80 TIBC 268  AlkPhos 131 Cr 0.83  8/27/19 Cr 1 AST 15 ALT 18 Bili 0.3 WBC 5.1  RBC 5.32 Hg 10.1 MCV 60.9 RDW 30.2  Ferritin 48 Iron 44 TIBC 333  2/18/19 Ferritin 12 Hg electrophoresis Hg A2 1.7 Hg A1 52.2 Hg G 46.1%  1/30/19 FOBT neg  1/23/19 folic acid 4.9       Past Medical History:   Diagnosis Date    Anemia, unspecified     Essential (primary) hypertension        Past Surgical History:   Procedure Laterality Date    COLONOSCOPY  04/03/2019    ESOPHAGOGASTRODUODENOSCOPY  2023       Family History   Problem Relation Name Age of Onset    Hypertension Mother      Hypertension Father      Liver disease Father      Asthma Sister         Social History     Socioeconomic History    Marital status:    Tobacco Use    Smoking status: Never     Passive exposure: Never    Smokeless tobacco: Never   Substance and Sexual Activity    Alcohol use: Never    Drug use: Never    Sexual activity: Never       Current Outpatient Medications   Medication Sig Dispense Refill    albuterol (PROVENTIL/VENTOLIN HFA) 90 mcg/actuation inhaler Inhale 2 puffs into the lungs every 6 (six) hours as needed. Rescue      AMLODIPINE BESYLATE, BULK, MISC 10 mg by Misc.(Non-Drug; Combo Route) route once daily.      amoxicillin-clavulanate 875-125mg (AUGMENTIN) 875-125 mg per tablet Take 1 tablet by mouth 2 (two) times daily.      aspirin (ECOTRIN) 81 MG EC tablet Take 81 mg by mouth once daily.      busPIRone (BUSPAR) 7.5 MG tablet Take 7.5 mg by mouth 3 (three) times daily.      EScitalopram oxalate (LEXAPRO) 10 MG tablet TAKE ONE TABLET BY MOUTH ONCE A DAY FOR ANXIETY      FEROSUL 325 mg (65 mg iron) Tab tablet TAKE ONE TABLET BY MOUTH ONCE DAILY ON AN EMPTY STOMACH WITH A GLASS OF ORANGE JUICE OR 500MG VITAMIN C TABLET. MAY EAT AN HOUR LATER      ferrous sulfate 325 (65 FE) MG EC tablet Take 1 tablet (325 mg total) by mouth once daily. Take on empty stomach. May eat an hour later. Take with vit C (glass orange juice or 500 mg Vit C tablets). 30 tablet 11    folic acid (FOLVITE) 1 MG tablet TAKE ONE TABLET BY  MOUTH DAILY 30 tablet 6    furosemide (LASIX) 40 MG tablet Take by mouth.      gabapentin (NEURONTIN) 300 MG capsule Take 300 mg by mouth 3 (three) times daily.      metoprolol succinate (TOPROL-XL) 100 MG 24 hr tablet Take 100 mg by mouth once daily.      NIFEdipine (PROCARDIA-XL) 60 MG (OSM) 24 hr tablet Take 1 tablet by mouth every morning.      pantoprazole (PROTONIX) 40 MG tablet Take 40 mg by mouth once daily.      PROCARDIA XL 60 mg 24 hr tablet Take 60 mg by mouth.      promethazine (PHENERGAN) 12.5 MG Tab Take 12.5 mg by mouth.      spironolactone (ALDACTONE) 25 MG tablet Take 25 mg by mouth once daily.      sucralfate (CARAFATE) 1 gram tablet Take 1 tablet (1 g total) by mouth 4 (four) times daily. 120 tablet 0    traZODone (DESYREL) 50 MG tablet Take 50 mg by mouth every evening.      vitamin D (VITAMIN D3) 1000 units Tab Take 1,000 Units by mouth once daily.       No current facility-administered medications for this visit.       Review of patient's allergies indicates:   Allergen Reactions    Lisinopril      Other reaction(s): angio adema    Aspirin Nausea Only     Other Reaction(s): Not Indicated             Radiology/Diagnostic Studies:  6/25/21 MMG benign  12/23/19 MMG screening: benign  Other imaging reviewed, nothing pertinent      Assessment/Plan:   1. Iron deficiency anemia due to chronic blood loss D50.0 She had positive FOBT previously. Extensive workup as detailed above negative other than some thrombosed internal hemorrhoids, diverticulosis and possibly small erosions in terminal ileum. No active bleeding noted  Her prior iron indices are consistent with BAYLEE, though most recent one 10/2021 was WNL. She has no other source of blood loss  Started on oral iron in October 2022 due to symptoms of fatigue.       2. 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 Her low MCV is noted on labs dating to at least 2015, with RBC >5 million but with Hg ~10. 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 Folic acid deficiency E53.8 Previously noted to have low folic acid. Repeat folic acid level WNL. C/w folic acid 1mg daily      4. HTN (hypertension) I10 C/w BP medications as prescribed by PCP. Discussed that she needs to start cooking her food and avoid fast foods or food high in salt content. Has follow up with PCP next month  I also advised her that she needs repeat Pap smear. She will discuss with PCP. Will refer to GYN for abnormal pap  MMG 6/25/21 benign, repeat due in 1 year        PLAN:  Labs reviewed low normal iron and hgb level.   Continue folic acid daily  Resume oral iron w/ orange juice daily  Recommend taking Miralax daily for iron induced constipation  Continue regular follow-up with MMG and PAP smear per PCP  Will plan for follow-up in 4 months with repeat CBC, CMP, iron panel, ferritin, and folic acid levels.                         This service was not originating from a related E/M service provided within the previous 7 days nor will  to an E/M service or procedure within the next 24 hours or my soonest available appointment.  Prevailing standard of care was able to be met in this audio-only visit.

## 2024-07-31 ENCOUNTER — HOSPITAL ENCOUNTER (EMERGENCY)
Facility: HOSPITAL | Age: 69
Discharge: HOME | End: 2024-07-31
Attending: FAMILY MEDICINE
Payer: MEDICARE

## 2024-07-31 VITALS
HEIGHT: 62 IN | HEART RATE: 85 BPM | OXYGEN SATURATION: 97 % | DIASTOLIC BLOOD PRESSURE: 109 MMHG | SYSTOLIC BLOOD PRESSURE: 167 MMHG | TEMPERATURE: 98 F | RESPIRATION RATE: 19 BRPM | WEIGHT: 210 LBS | BODY MASS INDEX: 38.64 KG/M2

## 2024-07-31 DIAGNOSIS — R07.9 CHEST PAIN: ICD-10-CM

## 2024-07-31 DIAGNOSIS — R03.0 ELEVATED BLOOD PRESSURE READING: Primary | ICD-10-CM

## 2024-07-31 LAB
ANION GAP SERPL CALC-SCNC: 9 MEQ/L
BACTERIA #/AREA URNS AUTO: NORMAL /HPF
BASOPHILS # BLD AUTO: 0.04 X10(3)/MCL
BASOPHILS NFR BLD AUTO: 0.6 %
BILIRUB UR QL STRIP.AUTO: NEGATIVE
BUN SERPL-MCNC: 24.7 MG/DL (ref 9.8–20.1)
CALCIUM SERPL-MCNC: 9.7 MG/DL (ref 8.4–10.2)
CHLORIDE SERPL-SCNC: 105 MMOL/L (ref 98–107)
CLARITY UR: CLEAR
CO2 SERPL-SCNC: 25 MMOL/L (ref 23–31)
COLOR UR AUTO: YELLOW
CREAT SERPL-MCNC: 1 MG/DL (ref 0.55–1.02)
CREAT/UREA NIT SERPL: 25
CRP SERPL HS-MCNC: 4.48 MG/L
D DIMER PPP IA.FEU-MCNC: 0.8 UG/ML FEU (ref 0–0.5)
EOSINOPHIL # BLD AUTO: 0.07 X10(3)/MCL (ref 0–0.9)
EOSINOPHIL NFR BLD AUTO: 1 %
ERYTHROCYTE [DISTWIDTH] IN BLOOD BY AUTOMATED COUNT: 21 % (ref 11.5–17)
GFR SERPLBLD CREATININE-BSD FMLA CKD-EPI: >60 ML/MIN/1.73/M2
GLUCOSE SERPL-MCNC: 109 MG/DL (ref 82–115)
GLUCOSE UR QL STRIP: NEGATIVE
HCT VFR BLD AUTO: 35.9 % (ref 37–47)
HGB BLD-MCNC: 11.7 G/DL (ref 12–16)
HGB UR QL STRIP: NEGATIVE
IMM GRANULOCYTES # BLD AUTO: 0.01 X10(3)/MCL (ref 0–0.04)
IMM GRANULOCYTES NFR BLD AUTO: 0.1 %
KETONES UR QL STRIP: NEGATIVE
LEUKOCYTE ESTERASE UR QL STRIP: NEGATIVE
LYMPHOCYTES # BLD AUTO: 1.12 X10(3)/MCL (ref 0.6–4.6)
LYMPHOCYTES NFR BLD AUTO: 16.7 %
MAGNESIUM SERPL-MCNC: 1.8 MG/DL (ref 1.6–2.6)
MCH RBC QN AUTO: 22 PG (ref 27–31)
MCHC RBC AUTO-ENTMCNC: 32.6 G/DL (ref 33–36)
MCV RBC AUTO: 67.6 FL (ref 80–94)
MONOCYTES # BLD AUTO: 0.46 X10(3)/MCL (ref 0.1–1.3)
MONOCYTES NFR BLD AUTO: 6.9 %
NEUTROPHILS # BLD AUTO: 5.01 X10(3)/MCL (ref 2.1–9.2)
NEUTROPHILS NFR BLD AUTO: 74.7 %
NITRITE UR QL STRIP: NEGATIVE
PH UR STRIP: 7 [PH]
PLATELET # BLD AUTO: 263 X10(3)/MCL (ref 130–400)
PMV BLD AUTO: ABNORMAL FL
POTASSIUM SERPL-SCNC: 3.7 MMOL/L (ref 3.5–5.1)
PROT UR QL STRIP: ABNORMAL
RBC # BLD AUTO: 5.31 X10(6)/MCL (ref 4.2–5.4)
RBC #/AREA URNS AUTO: NORMAL /HPF
SODIUM SERPL-SCNC: 139 MMOL/L (ref 136–145)
SP GR UR STRIP.AUTO: 1.02 (ref 1–1.03)
SQUAMOUS #/AREA URNS AUTO: NORMAL /HPF
TROPONIN I SERPL-MCNC: 0.01 NG/ML (ref 0–0.04)
TROPONIN I SERPL-MCNC: 0.01 NG/ML (ref 0–0.04)
UROBILINOGEN UR STRIP-ACNC: 0.2
WBC # BLD AUTO: 6.71 X10(3)/MCL (ref 4.5–11.5)
WBC #/AREA URNS AUTO: NORMAL /HPF

## 2024-07-31 PROCEDURE — 85379 FIBRIN DEGRADATION QUANT: CPT | Performed by: FAMILY MEDICINE

## 2024-07-31 PROCEDURE — 84484 ASSAY OF TROPONIN QUANT: CPT | Mod: 91 | Performed by: SPECIALIST

## 2024-07-31 PROCEDURE — 86141 C-REACTIVE PROTEIN HS: CPT | Performed by: FAMILY MEDICINE

## 2024-07-31 PROCEDURE — 93010 ELECTROCARDIOGRAM REPORT: CPT | Mod: ,,, | Performed by: INTERNAL MEDICINE

## 2024-07-31 PROCEDURE — 81001 URINALYSIS AUTO W/SCOPE: CPT | Performed by: FAMILY MEDICINE

## 2024-07-31 PROCEDURE — 99285 EMERGENCY DEPT VISIT HI MDM: CPT | Mod: 25

## 2024-07-31 PROCEDURE — 85025 COMPLETE CBC W/AUTO DIFF WBC: CPT | Performed by: FAMILY MEDICINE

## 2024-07-31 PROCEDURE — 93005 ELECTROCARDIOGRAM TRACING: CPT

## 2024-07-31 PROCEDURE — 81003 URINALYSIS AUTO W/O SCOPE: CPT | Performed by: FAMILY MEDICINE

## 2024-07-31 PROCEDURE — 83735 ASSAY OF MAGNESIUM: CPT | Performed by: FAMILY MEDICINE

## 2024-07-31 PROCEDURE — 25000003 PHARM REV CODE 250: Performed by: FAMILY MEDICINE

## 2024-07-31 PROCEDURE — 84484 ASSAY OF TROPONIN QUANT: CPT | Performed by: FAMILY MEDICINE

## 2024-07-31 PROCEDURE — 80048 BASIC METABOLIC PNL TOTAL CA: CPT | Performed by: FAMILY MEDICINE

## 2024-07-31 RX ADMIN — NITROGLYCERIN 1 INCH: 20 OINTMENT TOPICAL at 05:07

## 2024-08-01 LAB
OHS QRS DURATION: 70 MS
OHS QTC CALCULATION: 442 MS

## 2024-09-04 DIAGNOSIS — Z12.31 SCREENING MAMMOGRAM FOR BREAST CANCER: Primary | ICD-10-CM

## 2024-09-17 DIAGNOSIS — N18.9 CHRONIC KIDNEY DISEASE, UNSPECIFIED: Primary | ICD-10-CM

## 2024-09-18 ENCOUNTER — HOSPITAL ENCOUNTER (OUTPATIENT)
Dept: RADIOLOGY | Facility: HOSPITAL | Age: 69
Discharge: HOME | End: 2024-09-18
Attending: NURSE PRACTITIONER
Payer: MEDICARE

## 2024-09-18 DIAGNOSIS — Z12.31 SCREENING MAMMOGRAM FOR BREAST CANCER: ICD-10-CM

## 2024-09-18 PROCEDURE — 77063 BREAST TOMOSYNTHESIS BI: CPT | Mod: 26,,, | Performed by: RADIOLOGY

## 2024-09-18 PROCEDURE — 77067 SCR MAMMO BI INCL CAD: CPT | Mod: 26,,, | Performed by: RADIOLOGY

## 2024-09-18 PROCEDURE — 77063 BREAST TOMOSYNTHESIS BI: CPT | Mod: TC

## 2024-09-25 ENCOUNTER — HOSPITAL ENCOUNTER (OUTPATIENT)
Dept: RADIOLOGY | Facility: HOSPITAL | Age: 69
Discharge: HOME | End: 2024-09-25
Attending: INTERNAL MEDICINE
Payer: MEDICARE

## 2024-09-25 DIAGNOSIS — N18.9 CHRONIC KIDNEY DISEASE, UNSPECIFIED: ICD-10-CM

## 2024-09-25 PROCEDURE — 76770 US EXAM ABDO BACK WALL COMP: CPT | Mod: TC

## 2024-10-11 DIAGNOSIS — E53.8 FOLIC ACID DEFICIENCY: ICD-10-CM

## 2024-10-11 DIAGNOSIS — D50.9 IRON DEFICIENCY ANEMIA, UNSPECIFIED IRON DEFICIENCY ANEMIA TYPE: Primary | ICD-10-CM

## 2024-10-24 ENCOUNTER — OFFICE VISIT (OUTPATIENT)
Dept: HEMATOLOGY/ONCOLOGY | Facility: CLINIC | Age: 69
End: 2024-10-24
Payer: MEDICARE

## 2024-10-24 VITALS
BODY MASS INDEX: 38.13 KG/M2 | SYSTOLIC BLOOD PRESSURE: 152 MMHG | HEIGHT: 62 IN | HEART RATE: 72 BPM | RESPIRATION RATE: 14 BRPM | OXYGEN SATURATION: 99 % | TEMPERATURE: 98 F | WEIGHT: 207.19 LBS | DIASTOLIC BLOOD PRESSURE: 100 MMHG

## 2024-10-24 DIAGNOSIS — E53.8 FOLIC ACID DEFICIENCY: ICD-10-CM

## 2024-10-24 DIAGNOSIS — D50.9 IRON DEFICIENCY ANEMIA, UNSPECIFIED IRON DEFICIENCY ANEMIA TYPE: Primary | ICD-10-CM

## 2024-10-24 PROBLEM — F41.9 ANXIETY: Status: ACTIVE | Noted: 2024-10-24

## 2024-10-24 PROBLEM — F40.240 CLAUSTROPHOBIA: Status: ACTIVE | Noted: 2024-10-24

## 2024-10-24 PROBLEM — K21.9 GASTROESOPHAGEAL REFLUX DISEASE: Status: ACTIVE | Noted: 2024-10-24

## 2024-10-24 PROBLEM — I10 HYPERTENSION: Status: ACTIVE | Noted: 2024-10-24

## 2024-10-24 PROBLEM — D50.0 IRON DEFICIENCY ANEMIA DUE TO CHRONIC BLOOD LOSS: Status: ACTIVE | Noted: 2024-10-24

## 2024-10-24 PROBLEM — E78.5 HYPERLIPIDEMIA: Status: ACTIVE | Noted: 2024-10-24

## 2024-10-24 PROBLEM — D58.2 HEMOGLOBINOPATHY: Status: ACTIVE | Noted: 2024-10-24

## 2024-10-24 PROBLEM — J45.909 ASTHMA: Status: ACTIVE | Noted: 2024-10-24

## 2024-10-24 RX ORDER — HYDRALAZINE HYDROCHLORIDE 25 MG/1
25 TABLET, FILM COATED ORAL
COMMUNITY
Start: 2024-09-17

## 2024-10-24 RX ORDER — MIRTAZAPINE 15 MG/1
15 TABLET, FILM COATED ORAL
COMMUNITY

## 2024-10-24 NOTE — PROGRESS NOTES
HPI:      65 y/o F w/ PMHx of HTN, HLD, anemia, GERD, depression referred to Hematology at [facility] for anemia    EGD 4/2/19 unremarkable. FOBT at same time positive  Colonoscopy 4/3/19 found nonthrombosed internal hemorrhoids, diverticulosis in sigmoid and descending colon but no clear source of blood loss.  Nuclear medicine bleeding scan showed equivocal focus of GI bleeding in mid small bowel loops in left lower quadrant  M2A capsule endoscopy showed a few small erosions in terminal ileum but otherwise no gross blood loss in whole small bowel.          Interval History:    10/24/24:   She had labs done on October 15, 2024 that showed hemoglobin 11.1 creatinine 1.57 platelets 331 ferritin 25 iron 67% sat 19.  She states that she has not been compliant with her oral iron due to continued constipation.  Currently she is working with Cardiology and Nephrology in terms of managing her blood pressure.  She has continued fatigue.  She denies any bright red blood per rectum or dark stools.      Past Medical History:   Diagnosis Date    Anemia, unspecified     Essential (primary) hypertension        Past Surgical History:   Procedure Laterality Date    COLONOSCOPY  04/03/2019    ESOPHAGOGASTRODUODENOSCOPY  2023       Family History   Problem Relation Name Age of Onset    Hypertension Mother      Hypertension Father      Liver disease Father      Asthma Sister         Social History     Socioeconomic History    Marital status:    Tobacco Use    Smoking status: Never     Passive exposure: Never    Smokeless tobacco: Never   Substance and Sexual Activity    Alcohol use: Never    Drug use: Never    Sexual activity: Never       Current Outpatient Medications   Medication Sig Dispense Refill    albuterol (PROVENTIL/VENTOLIN HFA) 90 mcg/actuation inhaler Inhale 2 puffs into the lungs every 6 (six) hours as needed. Rescue      EScitalopram oxalate (LEXAPRO) 10 MG tablet TAKE ONE TABLET BY MOUTH ONCE A DAY FOR ANXIETY       FEROSUL 325 mg (65 mg iron) Tab tablet TAKE ONE TABLET BY MOUTH ONCE DAILY ON AN EMPTY STOMACH WITH A GLASS OF ORANGE JUICE OR 500MG VITAMIN C TABLET. MAY EAT AN HOUR LATER      ferrous sulfate 325 (65 FE) MG EC tablet Take 1 tablet (325 mg total) by mouth once daily. Take on empty stomach. May eat an hour later. Take with vit C (glass orange juice or 500 mg Vit C tablets). 30 tablet 11    folic acid (FOLVITE) 1 MG tablet TAKE ONE TABLET BY MOUTH DAILY 30 tablet 6    furosemide (LASIX) 40 MG tablet Take by mouth.      gabapentin (NEURONTIN) 300 MG capsule Take 300 mg by mouth 3 (three) times daily.      hydrALAZINE (APRESOLINE) 25 MG tablet Take 25 mg by mouth.      metoprolol succinate (TOPROL-XL) 100 MG 24 hr tablet Take 100 mg by mouth once daily.      pantoprazole (PROTONIX) 40 MG tablet Take 40 mg by mouth once daily.      spironolactone (ALDACTONE) 25 MG tablet Take 25 mg by mouth once daily.      vitamin D (VITAMIN D3) 1000 units Tab Take 1,000 Units by mouth once daily.      AMLODIPINE BESYLATE, BULK, MISC 10 mg by Misc.(Non-Drug; Combo Route) route once daily. (Patient not taking: Reported on 10/24/2024)      amoxicillin-clavulanate 875-125mg (AUGMENTIN) 875-125 mg per tablet Take 1 tablet by mouth 2 (two) times daily. (Patient not taking: Reported on 10/24/2024)      aspirin (ECOTRIN) 81 MG EC tablet Take 81 mg by mouth once daily. (Patient not taking: Reported on 10/24/2024)      busPIRone (BUSPAR) 7.5 MG tablet Take 7.5 mg by mouth 3 (three) times daily. (Patient not taking: Reported on 10/24/2024)      mirtazapine (REMERON) 15 MG tablet Take 15 mg by mouth.      NIFEdipine (PROCARDIA-XL) 60 MG (OSM) 24 hr tablet Take 1 tablet by mouth every morning. (Patient not taking: Reported on 10/24/2024)      PROCARDIA XL 60 mg 24 hr tablet Take 60 mg by mouth. (Patient not taking: Reported on 10/24/2024)      promethazine (PHENERGAN) 12.5 MG Tab Take 12.5 mg by mouth. (Patient not taking: Reported on 10/24/2024)       sucralfate (CARAFATE) 1 gram tablet Take 1 tablet (1 g total) by mouth 4 (four) times daily. (Patient not taking: Reported on 10/24/2024) 120 tablet 0    traZODone (DESYREL) 50 MG tablet Take 50 mg by mouth every evening. (Patient not taking: Reported on 10/24/2024)       No current facility-administered medications for this visit.       Review of patient's allergies indicates:   Allergen Reactions    Lisinopril      Other reaction(s): angio adema    Aspirin Nausea Only     Other Reaction(s): Not Indicated           Review of systems  CONSTITUTIONAL: no fevers, no chills, no weight loss, no fatigue, no weakness  HEMATOLOGIC: no abnormal bleeding, no abnormal bruising, no drenching night sweats  ONCOLOGIC: no new masses or lumps  HEENT: no vision loss, no tinnitus or hearing loss, no nose bleeding, no dysphagia, no odynophagia  CVS: no chest pain, no palpitations, no dyspnea on exertion  RESP: no shortness of breath, no hemoptysis, no cough  GI:  Positive constipation positive black stools due to medication positive abdominal cramping : no dysuria, no hematuria, no hesitancy, no scrotal swelling, no discharge  INTEGUMENT: no rashes, no abnormal bruising, no nail pitting, no hyperpigmentation  NEURO: no falls, no memory loss, no paresthesias or dysesthesias, no urofecal incontinence or retention, no loss of strength on any extremity  MSK: no back pain, no new joint pain, no joint swelling  PSYCH: no suicidal or homicidal ideation, no depression, no insomnia, no anhedonia  ENDOCRINE: no heat or cold intolerance, no polyuria, no polydipsia      Physical Exam:  Vitals:    10/24/24 0905   BP: (!) 161/102   Pulse: 72   Resp: 14   Temp: 97.6 °F (36.4 °C)       ECOG PS 0  GA: AAOx3, NAD  HEENT: NCAT, PERRLA, EOMI, good dentition, moist oral mucous membranes  LYMPH: no cervical, axillary or supraclavicular adenopathy  CVS: s1s2 RRR, no M/R/G  RESP: CTA b/l, no crackles, no wheezes or rhonchi  ABD: soft, NT, ND, BS+, no  hepatosplenomegaly  EXT: no deformities, no pedal edema  SKIN: no rashes, warm and dry  NEURO: normal mentation, strength 5/5 on all 4 extremities, no sensory deficits    LABS:  As above  RADIOLOGY:  None  PATHOLOGY  None  Assessment  1. Iron-deficiency  2. Hemoglobinopathy, hemoglobin H with alpha Thal trait  3. Folic acid deficiency      Plan   I reviewed labs in detail with patient and she has continued iron-deficiency.  Her anemia is likely contributed by alpha towel and chronic kidney disease and I do not believe it will be better than 12 at any point.  In the future for creatinine worsens or other chronic conditions make her iron less than 10 she may be a candidate for Procrit.  At this time due to her severe constipation from iron pills and inconsistency with taking it due to symptoms I recommend she receive Venofer 20 mg x 6 doses.  We will recheck her iron levels 1 week after infusion and then again in 3 months.  She can follow up at that time.  Risks/benefits/alternatives of infusion were discussed with patient in detail      A total of  30 minutes were spent in review of records, interpretation of test, coordination of care, discussion and counseling with the patient.        Portions of the record may have been created with voice recognition software. Occasional wrong-word or sound-a-like substitutions may have occurred due to the inherent limitations of voice recognition software. Read the chart carefully and recognize, using context, where substitutions have occurred.

## 2024-12-03 ENCOUNTER — HOSPITAL ENCOUNTER (INPATIENT)
Facility: HOSPITAL | Age: 69
LOS: 2 days | Discharge: HOME HEALTH | DRG: 312 | End: 2024-12-05
Attending: STUDENT IN AN ORGANIZED HEALTH CARE EDUCATION/TRAINING PROGRAM | Admitting: HOSPITALIST
Payer: MEDICARE
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
## 2024-12-03 DIAGNOSIS — R55 SYNCOPE, UNSPECIFIED SYNCOPE TYPE: Primary | ICD-10-CM

## 2024-12-03 DIAGNOSIS — R07.9 CHEST PAIN: ICD-10-CM
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
## 2024-12-03 DIAGNOSIS — R55 SYNCOPE: ICD-10-CM

## 2024-12-03 DIAGNOSIS — I10 HYPERTENSION, UNSPECIFIED TYPE: ICD-10-CM

## 2024-12-03 LAB
ALBUMIN SERPL-MCNC: 3.8 G/DL (ref 3.4–4.8)
ALBUMIN/GLOB SERPL: 1.1 RATIO (ref 1.1–2)
ALP SERPL-CCNC: 97 UNIT/L (ref 40–150)
ALT SERPL-CCNC: 5 UNIT/L (ref 0–55)
ANION GAP SERPL CALC-SCNC: 9 MEQ/L
ANISOCYTOSIS BLD QL SMEAR: ABNORMAL
AST SERPL-CCNC: 13 UNIT/L (ref 5–34)
BASOPHILS # BLD AUTO: 0.02 X10(3)/MCL
BASOPHILS NFR BLD AUTO: 0.3 %
BILIRUB SERPL-MCNC: 0.4 MG/DL
BNP BLD-MCNC: 316.3 PG/ML
BUN SERPL-MCNC: 23.4 MG/DL (ref 9.8–20.1)
CALCIUM SERPL-MCNC: 10 MG/DL (ref 8.4–10.2)
CHLORIDE SERPL-SCNC: 109 MMOL/L (ref 98–107)
CO2 SERPL-SCNC: 23 MMOL/L (ref 23–31)
CREAT SERPL-MCNC: 1.13 MG/DL (ref 0.55–1.02)
CREAT/UREA NIT SERPL: 21
EOSINOPHIL # BLD AUTO: 0.06 X10(3)/MCL (ref 0–0.9)
EOSINOPHIL NFR BLD AUTO: 0.9 %
ERYTHROCYTE [DISTWIDTH] IN BLOOD BY AUTOMATED COUNT: 21 % (ref 11.5–17)
GFR SERPLBLD CREATININE-BSD FMLA CKD-EPI: 53 ML/MIN/1.73/M2
GLOBULIN SER-MCNC: 3.4 GM/DL (ref 2.4–3.5)
GLUCOSE SERPL-MCNC: 97 MG/DL (ref 82–115)
GROUP & RH: NORMAL
HCT VFR BLD AUTO: 34.9 % (ref 37–47)
HGB BLD-MCNC: 11.1 G/DL (ref 12–16)
IMM GRANULOCYTES # BLD AUTO: 0.02 X10(3)/MCL (ref 0–0.04)
IMM GRANULOCYTES NFR BLD AUTO: 0.3 %
INDIRECT COOMBS: NORMAL
LYMPHOCYTES # BLD AUTO: 0.89 X10(3)/MCL (ref 0.6–4.6)
LYMPHOCYTES NFR BLD AUTO: 13.8 %
MCH RBC QN AUTO: 21.7 PG (ref 27–31)
MCHC RBC AUTO-ENTMCNC: 31.8 G/DL (ref 33–36)
MCV RBC AUTO: 68.2 FL (ref 80–94)
MONOCYTES # BLD AUTO: 0.6 X10(3)/MCL (ref 0.1–1.3)
MONOCYTES NFR BLD AUTO: 9.3 %
NEUTROPHILS # BLD AUTO: 4.87 X10(3)/MCL (ref 2.1–9.2)
NEUTROPHILS NFR BLD AUTO: 75.4 %
NRBC BLD AUTO-RTO: 0 %
PLATELET # BLD AUTO: 242 X10(3)/MCL (ref 130–400)
PLATELET # BLD EST: NORMAL 10*3/UL
PLATELETS.RETICULATED NFR BLD AUTO: 8.2 % (ref 0.9–11.2)
PMV BLD AUTO: ABNORMAL FL
POIKILOCYTOSIS BLD QL SMEAR: ABNORMAL
POTASSIUM SERPL-SCNC: 4.4 MMOL/L (ref 3.5–5.1)
PROT SERPL-MCNC: 7.2 GM/DL (ref 5.8–7.6)
RBC # BLD AUTO: 5.12 X10(6)/MCL (ref 4.2–5.4)
RBC MORPH BLD: ABNORMAL
SCHISTOCYTE (OLG): ABNORMAL
SODIUM SERPL-SCNC: 141 MMOL/L (ref 136–145)
SPECIMEN OUTDATE: NORMAL
TARGETS BLD QL SMEAR: SLIGHT
TROPONIN I SERPL-MCNC: <0.01 NG/ML (ref 0–0.04)
WBC # BLD AUTO: 6.46 X10(3)/MCL (ref 4.5–11.5)

## 2024-12-03 PROCEDURE — 25500020 PHARM REV CODE 255: Performed by: STUDENT IN AN ORGANIZED HEALTH CARE EDUCATION/TRAINING PROGRAM

## 2024-12-03 PROCEDURE — 83880 ASSAY OF NATRIURETIC PEPTIDE: CPT

## 2024-12-03 PROCEDURE — 63600175 PHARM REV CODE 636 W HCPCS: Performed by: STUDENT IN AN ORGANIZED HEALTH CARE EDUCATION/TRAINING PROGRAM

## 2024-12-03 PROCEDURE — 80053 COMPREHEN METABOLIC PANEL: CPT

## 2024-12-03 PROCEDURE — 99285 EMERGENCY DEPT VISIT HI MDM: CPT | Mod: 25

## 2024-12-03 PROCEDURE — 93010 ELECTROCARDIOGRAM REPORT: CPT | Mod: ,,, | Performed by: STUDENT IN AN ORGANIZED HEALTH CARE EDUCATION/TRAINING PROGRAM

## 2024-12-03 PROCEDURE — 85025 COMPLETE CBC W/AUTO DIFF WBC: CPT

## 2024-12-03 PROCEDURE — 93005 ELECTROCARDIOGRAM TRACING: CPT

## 2024-12-03 PROCEDURE — 11000001 HC ACUTE MED/SURG PRIVATE ROOM

## 2024-12-03 PROCEDURE — 86850 RBC ANTIBODY SCREEN: CPT | Performed by: STUDENT IN AN ORGANIZED HEALTH CARE EDUCATION/TRAINING PROGRAM

## 2024-12-03 PROCEDURE — 96374 THER/PROPH/DIAG INJ IV PUSH: CPT

## 2024-12-03 PROCEDURE — 84484 ASSAY OF TROPONIN QUANT: CPT

## 2024-12-03 RX ORDER — ACETAMINOPHEN 10 MG/ML
1000 INJECTION, SOLUTION INTRAVENOUS ONCE
Status: COMPLETED | OUTPATIENT
Start: 2024-12-03 | End: 2024-12-03

## 2024-12-03 RX ADMIN — ACETAMINOPHEN 1000 MG: 10 INJECTION, SOLUTION INTRAVENOUS at 07:12

## 2024-12-03 RX ADMIN — IOHEXOL 100 ML: 350 INJECTION, SOLUTION INTRAVENOUS at 08:12

## 2024-12-03 NOTE — Clinical Note
Diagnosis: Syncope, unspecified syncope type [[id number]]   Future Attending Provider: SMITA STREET [[id number]]   Admit to which facility:: [facility] [[id number]]   Reason for IP Medical Treatment  (Clinical interventions that can only be accomplished in the IP setting? ) :: syncope workup   Plans for Post-Acute care--if anticipated (pick the single best option):: A. No post acute care anticipated at this time   Special Needs:: No Special Needs [1]

## 2024-12-03 NOTE — FIRST PROVIDER EVALUATION
Medical screening examination initiated.  I have conducted a focused provider triage encounter, findings are as follows:    Brief history of present illness:  arrived to ED due to fall. Patient reports she passed out and then fell. Unknown logistics of fall. -BT use. Patient c/o R hip pain and lower back pain that radiates down her LLE. Did not ambulate after fall.     Vitals:    12/03/24 1733 12/03/24 1734   BP: (!) 184/102    Pulse: 65    Resp: 18    Temp: 98 °F (36.7 °C)    TempSrc: Oral    SpO2: 99%    Weight:  97.5 kg (215 lb)       Pertinent physical exam:  awake, alert, GCS 15, has non-labored breathing. C-collar applied.      Brief workup plan:  labs, EKG, imaging     Preliminary workup initiated; this workup will be continued and followed by the physician or advanced practice provider that is assigned to the patient when roomed.

## 2024-12-04 LAB
ALBUMIN SERPL-MCNC: 3.5 G/DL (ref 3.4–4.8)
ALBUMIN/GLOB SERPL: 1.3 RATIO (ref 1.1–2)
ALP SERPL-CCNC: 90 UNIT/L (ref 40–150)
ALT SERPL-CCNC: 5 UNIT/L (ref 0–55)
AMPHET UR QL SCN: NEGATIVE
ANION GAP SERPL CALC-SCNC: 6 MEQ/L
APICAL FOUR CHAMBER EJECTION FRACTION: 55 %
APICAL TWO CHAMBER EJECTION FRACTION: 60 %
AST SERPL-CCNC: 14 UNIT/L (ref 5–34)
AV INDEX (PROSTH): 0.78
AV MEAN GRADIENT: 5 MMHG
AV PEAK GRADIENT: 9 MMHG
AV VALVE AREA BY VELOCITY RATIO: 2.5 CM²
AV VALVE AREA: 2.5 CM²
AV VELOCITY RATIO: 0.8
BACTERIA #/AREA URNS AUTO: ABNORMAL /HPF
BARBITURATE SCN PRESENT UR: NEGATIVE
BASOPHILS # BLD AUTO: 0.01 X10(3)/MCL
BASOPHILS NFR BLD AUTO: 0.1 %
BENZODIAZ UR QL SCN: NEGATIVE
BILIRUB SERPL-MCNC: 0.5 MG/DL
BILIRUB UR QL STRIP.AUTO: NEGATIVE
BUN SERPL-MCNC: 21.4 MG/DL (ref 9.8–20.1)
CALCIUM SERPL-MCNC: 9.7 MG/DL (ref 8.4–10.2)
CANNABINOIDS UR QL SCN: NEGATIVE
CHLORIDE SERPL-SCNC: 109 MMOL/L (ref 98–107)
CLARITY UR: CLEAR
CO2 SERPL-SCNC: 24 MMOL/L (ref 23–31)
COCAINE UR QL SCN: NEGATIVE
COLOR UR AUTO: COLORLESS
CREAT SERPL-MCNC: 1.09 MG/DL (ref 0.55–1.02)
CREAT/UREA NIT SERPL: 20
CRP SERPL-MCNC: 8.7 MG/L
CV ECHO LV RWT: 0.37 CM
DOP CALC AO PEAK VEL: 1.5 M/S
DOP CALC AO VTI: 31 CM
DOP CALC LVOT AREA: 3.1 CM2
DOP CALC LVOT DIAMETER: 2 CM
DOP CALC LVOT PEAK VEL: 1.2 M/S
DOP CALC LVOT STROKE VOLUME: 76.3 CM3
DOP CALC MV VTI: 30.8 CM
DOP CALCLVOT PEAK VEL VTI: 24.3 CM
E WAVE DECELERATION TIME: 177 MSEC
E/A RATIO: 1.13
E/E' RATIO: 17.09 M/S
ECHO LV POSTERIOR WALL: 1 CM (ref 0.6–1.1)
EOSINOPHIL # BLD AUTO: 0.05 X10(3)/MCL (ref 0–0.9)
EOSINOPHIL NFR BLD AUTO: 0.7 %
ERYTHROCYTE [DISTWIDTH] IN BLOOD BY AUTOMATED COUNT: 20.6 % (ref 11.5–17)
ETHANOL SERPL-MCNC: <10 MG/DL
FENTANYL UR QL SCN: NEGATIVE
FRACTIONAL SHORTENING: 33.3 % (ref 28–44)
GFR SERPLBLD CREATININE-BSD FMLA CKD-EPI: 56 ML/MIN/1.73/M2
GLOBULIN SER-MCNC: 2.6 GM/DL (ref 2.4–3.5)
GLUCOSE SERPL-MCNC: 118 MG/DL (ref 82–115)
GLUCOSE UR QL STRIP: NORMAL
HCT VFR BLD AUTO: 32.4 % (ref 37–47)
HGB BLD-MCNC: 10.4 G/DL (ref 12–16)
HGB UR QL STRIP: NEGATIVE
HR MV ECHO: 68 BPM
IMM GRANULOCYTES # BLD AUTO: 0.02 X10(3)/MCL (ref 0–0.04)
IMM GRANULOCYTES NFR BLD AUTO: 0.3 %
INTERVENTRICULAR SEPTUM: 1.1 CM (ref 0.6–1.1)
KETONES UR QL STRIP: NEGATIVE
LEFT ATRIUM AREA SYSTOLIC (APICAL 4 CHAMBER): 20.4 CM2
LEFT ATRIUM SIZE: 3.2 CM
LEFT INTERNAL DIMENSION IN SYSTOLE: 3.6 CM (ref 2.1–4)
LEFT VENTRICLE DIASTOLIC VOLUME: 141 ML
LEFT VENTRICLE END DIASTOLIC VOLUME APICAL 2 CHAMBER: 113 ML
LEFT VENTRICLE END DIASTOLIC VOLUME APICAL 4 CHAMBER: 93.6 ML
LEFT VENTRICLE END SYSTOLIC VOLUME APICAL 4 CHAMBER: 50.8 ML
LEFT VENTRICLE SYSTOLIC VOLUME: 54.4 ML
LEFT VENTRICULAR INTERNAL DIMENSION IN DIASTOLE: 5.4 CM (ref 3.5–6)
LEFT VENTRICULAR MASS: 220.6 G
LEUKOCYTE ESTERASE UR QL STRIP: NEGATIVE
LV LATERAL E/E' RATIO: 15.67 M/S
LV SEPTAL E/E' RATIO: 18.8 M/S
LVED V (TEICH): 141 ML
LVES V (TEICH): 54.4 ML
LVOT MG: 3 MMHG
LVOT MV: 0.75 CM/S
LYMPHOCYTES # BLD AUTO: 0.94 X10(3)/MCL (ref 0.6–4.6)
LYMPHOCYTES NFR BLD AUTO: 13.7 %
MAGNESIUM SERPL-MCNC: 2 MG/DL (ref 1.6–2.6)
MCH RBC QN AUTO: 21.8 PG (ref 27–31)
MCHC RBC AUTO-ENTMCNC: 32.1 G/DL (ref 33–36)
MCV RBC AUTO: 67.8 FL (ref 80–94)
MDMA UR QL SCN: NEGATIVE
MONOCYTES # BLD AUTO: 0.66 X10(3)/MCL (ref 0.1–1.3)
MONOCYTES NFR BLD AUTO: 9.6 %
MV MEAN GRADIENT: 2 MMHG
MV PEAK A VEL: 0.83 M/S
MV PEAK E VEL: 0.94 M/S
MV PEAK GRADIENT: 3 MMHG
MV STENOSIS PRESSURE HALF TIME: 47 MS
MV VALVE AREA BY CONTINUITY EQUATION: 2.48 CM2
MV VALVE AREA P 1/2 METHOD: 4.68 CM2
NEUTROPHILS # BLD AUTO: 5.19 X10(3)/MCL (ref 2.1–9.2)
NEUTROPHILS NFR BLD AUTO: 75.6 %
NITRITE UR QL STRIP: NEGATIVE
NRBC BLD AUTO-RTO: 0 %
OHS LV EJECTION FRACTION SIMPSONS BIPLANE MOD: 56 %
OHS QRS DURATION: 72 MS
OHS QTC CALCULATION: 397 MS
OPIATES UR QL SCN: NEGATIVE
PCP UR QL: NEGATIVE
PH UR STRIP: 6.5 [PH]
PH UR: 6.5 [PH] (ref 3–11)
PHOSPHATE SERPL-MCNC: 2.8 MG/DL (ref 2.3–4.7)
PISA TR MAX VEL: 2.34 M/S
PLATELET # BLD AUTO: 224 X10(3)/MCL (ref 130–400)
PLATELETS.RETICULATED NFR BLD AUTO: 7.7 % (ref 0.9–11.2)
PMV BLD AUTO: ABNORMAL FL
POTASSIUM SERPL-SCNC: 4.1 MMOL/L (ref 3.5–5.1)
PROT SERPL-MCNC: 6.1 GM/DL (ref 5.8–7.6)
PROT UR QL STRIP: NEGATIVE
PV PEAK GRADIENT: 4 MMHG
PV PEAK VELOCITY: 1 M/S
RA PRESSURE ESTIMATED: 3 MMHG
RBC # BLD AUTO: 4.78 X10(6)/MCL (ref 4.2–5.4)
RBC #/AREA URNS AUTO: ABNORMAL /HPF
RV TB RVSP: 5 MMHG
SODIUM SERPL-SCNC: 139 MMOL/L (ref 136–145)
SP GR UR STRIP.AUTO: 1.03 (ref 1–1.03)
SPECIFIC GRAVITY, URINE AUTO (.000) (OHS): 1.03 (ref 1–1.03)
SQUAMOUS #/AREA URNS LPF: ABNORMAL /HPF
TDI LATERAL: 0.06 M/S
TDI SEPTAL: 0.05 M/S
TDI: 0.06 M/S
TR MAX PG: 22 MMHG
TRICUSPID ANNULAR PLANE SYSTOLIC EXCURSION: 2.19 CM
TSH SERPL-ACNC: 1.36 UIU/ML (ref 0.35–4.94)
TV REST PULMONARY ARTERY PRESSURE: 25 MMHG
UROBILINOGEN UR STRIP-ACNC: NORMAL
WBC # BLD AUTO: 6.87 X10(3)/MCL (ref 4.5–11.5)
WBC #/AREA URNS AUTO: ABNORMAL /HPF

## 2024-12-04 PROCEDURE — 21400001 HC TELEMETRY ROOM

## 2024-12-04 PROCEDURE — 97162 PT EVAL MOD COMPLEX 30 MIN: CPT

## 2024-12-04 PROCEDURE — 86140 C-REACTIVE PROTEIN: CPT | Performed by: HOSPITALIST

## 2024-12-04 PROCEDURE — 80053 COMPREHEN METABOLIC PANEL: CPT | Performed by: NURSE PRACTITIONER

## 2024-12-04 PROCEDURE — 83735 ASSAY OF MAGNESIUM: CPT | Performed by: NURSE PRACTITIONER

## 2024-12-04 PROCEDURE — 80307 DRUG TEST PRSMV CHEM ANLYZR: CPT | Performed by: HOSPITALIST

## 2024-12-04 PROCEDURE — 82077 ASSAY SPEC XCP UR&BREATH IA: CPT | Performed by: HOSPITALIST

## 2024-12-04 PROCEDURE — 25000003 PHARM REV CODE 250: Performed by: NURSE PRACTITIONER

## 2024-12-04 PROCEDURE — 85025 COMPLETE CBC W/AUTO DIFF WBC: CPT | Performed by: NURSE PRACTITIONER

## 2024-12-04 PROCEDURE — 25000003 PHARM REV CODE 250: Performed by: HOSPITALIST

## 2024-12-04 PROCEDURE — 84443 ASSAY THYROID STIM HORMONE: CPT | Performed by: HOSPITALIST

## 2024-12-04 PROCEDURE — 83880 ASSAY OF NATRIURETIC PEPTIDE: CPT | Performed by: HOSPITALIST

## 2024-12-04 PROCEDURE — 63600175 PHARM REV CODE 636 W HCPCS: Performed by: NURSE PRACTITIONER

## 2024-12-04 PROCEDURE — 81001 URINALYSIS AUTO W/SCOPE: CPT | Performed by: HOSPITALIST

## 2024-12-04 PROCEDURE — 25000003 PHARM REV CODE 250

## 2024-12-04 PROCEDURE — 84100 ASSAY OF PHOSPHORUS: CPT | Performed by: NURSE PRACTITIONER

## 2024-12-04 RX ORDER — NALOXONE HCL 0.4 MG/ML
0.02 VIAL (ML) INJECTION
Status: DISCONTINUED | OUTPATIENT
Start: 2024-12-04 | End: 2024-12-05 | Stop reason: HOSPADM

## 2024-12-04 RX ORDER — ONDANSETRON HYDROCHLORIDE 2 MG/ML
4 INJECTION, SOLUTION INTRAVENOUS EVERY 4 HOURS PRN
Status: DISCONTINUED | OUTPATIENT
Start: 2024-12-04 | End: 2024-12-05 | Stop reason: HOSPADM

## 2024-12-04 RX ORDER — FOLIC ACID 1 MG/1
1000 TABLET ORAL DAILY
Status: DISCONTINUED | OUTPATIENT
Start: 2024-12-04 | End: 2024-12-05 | Stop reason: HOSPADM

## 2024-12-04 RX ORDER — SODIUM CHLORIDE 9 MG/ML
INJECTION, SOLUTION INTRAVENOUS CONTINUOUS
Status: ACTIVE | OUTPATIENT
Start: 2024-12-04 | End: 2024-12-05

## 2024-12-04 RX ORDER — ALBUTEROL SULFATE 90 UG/1
2 INHALANT RESPIRATORY (INHALATION) EVERY 6 HOURS PRN
Status: DISCONTINUED | OUTPATIENT
Start: 2024-12-04 | End: 2024-12-05 | Stop reason: HOSPADM

## 2024-12-04 RX ORDER — HYDRALAZINE HYDROCHLORIDE 25 MG/1
25 TABLET, FILM COATED ORAL EVERY 8 HOURS
Status: DISCONTINUED | OUTPATIENT
Start: 2024-12-04 | End: 2024-12-04

## 2024-12-04 RX ORDER — METOPROLOL SUCCINATE 50 MG/1
50 TABLET, EXTENDED RELEASE ORAL DAILY
Status: DISCONTINUED | OUTPATIENT
Start: 2024-12-04 | End: 2024-12-05 | Stop reason: HOSPADM

## 2024-12-04 RX ORDER — ENOXAPARIN SODIUM 100 MG/ML
40 INJECTION SUBCUTANEOUS EVERY 24 HOURS
Status: DISCONTINUED | OUTPATIENT
Start: 2024-12-04 | End: 2024-12-05 | Stop reason: HOSPADM

## 2024-12-04 RX ORDER — LANOLIN ALCOHOL/MO/W.PET/CERES
1 CREAM (GRAM) TOPICAL DAILY
Status: DISCONTINUED | OUTPATIENT
Start: 2024-12-04 | End: 2024-12-05 | Stop reason: HOSPADM

## 2024-12-04 RX ORDER — DOXAZOSIN 1 MG/1
2 TABLET ORAL DAILY
Status: DISCONTINUED | OUTPATIENT
Start: 2024-12-04 | End: 2024-12-05 | Stop reason: HOSPADM

## 2024-12-04 RX ORDER — PANTOPRAZOLE SODIUM 40 MG/1
40 TABLET, DELAYED RELEASE ORAL DAILY
Status: DISCONTINUED | OUTPATIENT
Start: 2024-12-04 | End: 2024-12-05 | Stop reason: HOSPADM

## 2024-12-04 RX ORDER — ESCITALOPRAM OXALATE 10 MG/1
10 TABLET ORAL DAILY
Status: DISCONTINUED | OUTPATIENT
Start: 2024-12-04 | End: 2024-12-05 | Stop reason: HOSPADM

## 2024-12-04 RX ORDER — PROCHLORPERAZINE EDISYLATE 5 MG/ML
5 INJECTION INTRAMUSCULAR; INTRAVENOUS EVERY 6 HOURS PRN
Status: DISCONTINUED | OUTPATIENT
Start: 2024-12-04 | End: 2024-12-05 | Stop reason: HOSPADM

## 2024-12-04 RX ORDER — GABAPENTIN 300 MG/1
300 CAPSULE ORAL 3 TIMES DAILY
Status: DISCONTINUED | OUTPATIENT
Start: 2024-12-04 | End: 2024-12-05 | Stop reason: HOSPADM

## 2024-12-04 RX ORDER — POLYETHYLENE GLYCOL 3350 17 G/17G
17 POWDER, FOR SOLUTION ORAL 2 TIMES DAILY PRN
Status: DISCONTINUED | OUTPATIENT
Start: 2024-12-04 | End: 2024-12-05 | Stop reason: HOSPADM

## 2024-12-04 RX ORDER — HYDRALAZINE HYDROCHLORIDE 20 MG/ML
10 INJECTION INTRAMUSCULAR; INTRAVENOUS EVERY 4 HOURS PRN
Status: DISCONTINUED | OUTPATIENT
Start: 2024-12-04 | End: 2024-12-05 | Stop reason: HOSPADM

## 2024-12-04 RX ORDER — DOXAZOSIN 2 MG/1
2 TABLET ORAL DAILY
Status: ON HOLD | COMMUNITY
End: 2024-12-05 | Stop reason: HOSPADM

## 2024-12-04 RX ORDER — ACETAMINOPHEN 325 MG/1
650 TABLET ORAL EVERY 6 HOURS PRN
Status: DISCONTINUED | OUTPATIENT
Start: 2024-12-04 | End: 2024-12-05 | Stop reason: HOSPADM

## 2024-12-04 RX ORDER — TALC
6 POWDER (GRAM) TOPICAL NIGHTLY PRN
Status: DISCONTINUED | OUTPATIENT
Start: 2024-12-04 | End: 2024-12-05 | Stop reason: HOSPADM

## 2024-12-04 RX ORDER — SIMETHICONE 80 MG
1 TABLET,CHEWABLE ORAL 4 TIMES DAILY PRN
Status: DISCONTINUED | OUTPATIENT
Start: 2024-12-04 | End: 2024-12-05 | Stop reason: HOSPADM

## 2024-12-04 RX ORDER — MIRTAZAPINE 7.5 MG/1
7.5 TABLET, FILM COATED ORAL NIGHTLY
Status: DISCONTINUED | OUTPATIENT
Start: 2024-12-04 | End: 2024-12-05 | Stop reason: HOSPADM

## 2024-12-04 RX ORDER — ALUMINUM HYDROXIDE, MAGNESIUM HYDROXIDE, AND SIMETHICONE 1200; 120; 1200 MG/30ML; MG/30ML; MG/30ML
30 SUSPENSION ORAL 4 TIMES DAILY PRN
Status: DISCONTINUED | OUTPATIENT
Start: 2024-12-04 | End: 2024-12-05 | Stop reason: HOSPADM

## 2024-12-04 RX ADMIN — ACETAMINOPHEN 650 MG: 325 TABLET, FILM COATED ORAL at 08:12

## 2024-12-04 RX ADMIN — GABAPENTIN 300 MG: 300 CAPSULE ORAL at 12:12

## 2024-12-04 RX ADMIN — FOLIC ACID 1000 MCG: 1 TABLET ORAL at 08:12

## 2024-12-04 RX ADMIN — PANTOPRAZOLE SODIUM 40 MG: 40 TABLET, DELAYED RELEASE ORAL at 08:12

## 2024-12-04 RX ADMIN — MIRTAZAPINE 7.5 MG: 7.5 TABLET, FILM COATED ORAL at 08:12

## 2024-12-04 RX ADMIN — SODIUM CHLORIDE: 9 INJECTION, SOLUTION INTRAVENOUS at 05:12

## 2024-12-04 RX ADMIN — ACETAMINOPHEN 650 MG: 325 TABLET, FILM COATED ORAL at 01:12

## 2024-12-04 RX ADMIN — DOXAZOSIN 2 MG: 1 TABLET ORAL at 05:12

## 2024-12-04 RX ADMIN — METOPROLOL SUCCINATE 50 MG: 50 TABLET, EXTENDED RELEASE ORAL at 08:12

## 2024-12-04 RX ADMIN — PROCHLORPERAZINE EDISYLATE 5 MG: 5 INJECTION INTRAMUSCULAR; INTRAVENOUS at 12:12

## 2024-12-04 RX ADMIN — GABAPENTIN 300 MG: 300 CAPSULE ORAL at 08:12

## 2024-12-04 RX ADMIN — SODIUM CHLORIDE: 9 INJECTION, SOLUTION INTRAVENOUS at 08:12

## 2024-12-04 RX ADMIN — HYDRALAZINE HYDROCHLORIDE 10 MG: 20 INJECTION INTRAMUSCULAR; INTRAVENOUS at 01:12

## 2024-12-04 RX ADMIN — ESCITALOPRAM OXALATE 10 MG: 10 TABLET ORAL at 08:12

## 2024-12-04 RX ADMIN — ENOXAPARIN SODIUM 40 MG: 40 INJECTION SUBCUTANEOUS at 05:12

## 2024-12-04 RX ADMIN — ONDANSETRON 4 MG: 2 INJECTION INTRAMUSCULAR; INTRAVENOUS at 09:12

## 2024-12-04 NOTE — PLAN OF CARE
Problem: Physical Therapy  Goal: Physical Therapy Goal  Description: Goals to be met by: 25     Patient will increase functional independence with mobility by performin. Supine to sit with [place]oth  2. Sit to supine with Mammoth  3. Sit to stand transfer with Modified [place]  4. Gait  x 300 feet with Modified Mammoth using Rolling Walker.     Outcome: Progressing

## 2024-12-04 NOTE — ED PROVIDER NOTES
"Encounter Date: 12/3/2024    SCRIBE #1 NOTE: I, Sandy Lema, am scribing for, and in the presence of,  Jaspreet Sims MD. I have scribed the following portions of the note - the EKG reading. Other sections scribed: HPI, ROS, PE.       History     Chief Complaint   Patient presents with    Fall     States got dizzy and had a Trip/fall today. +LOC, -BT. Denies head strike. Denies blood thinners. C/o R hip pain and L lower back pain. Pt answers all questions appropriately but states she feels "confused" also states her aunt  this morning and its been a stressful day. GCS 15     Patient is a 69 year old female with a history of anemia and hypertension that presents to the ED following a fall. Patient reports after taking a nap, she walked outside of her apartment. She reports becoming dizzy and generally weak; notes that is the last thing she remembers. She thinks she may have fallen into a nearby pole. She complains of back pain, right hip pain, right knee pain, and right chest pain. Notes the chest pain has since resolved. She denies neck pain and abdominal pain. 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 Notes a vitamin treatment with her oncologist yesterday. She also reports stress because of her a recent death in her family.     The history is provided by the patient and medical records.     Review of patient's allergies indicates:   Allergen Reactions    Lisinopril      Other reaction(s): angio adema    Aspirin Nausea Only     Other Reaction(s): Not Indicated       Past Medical History:   Diagnosis Date    Anemia, unspecified     Essential (primary) hypertension      Past Surgical History:   Procedure Laterality Date    COLONOSCOPY  2019    ESOPHAGOGASTRODUODENOSCOPY       Family History   Problem Relation Name Age of Onset    Hypertension Mother      Hypertension Father      Liver disease Father      Asthma Sister       Social History     Tobacco Use    Smoking status: Never     Passive exposure: " Never    Smokeless tobacco: Never   Substance Use Topics    Alcohol use: Never    Drug use: Never     Review of Systems   Constitutional:  Negative for chills and fever.        Generalized weakness    HENT:  Negative for congestion, rhinorrhea and sore throat.    Eyes:  Negative for visual disturbance.   Respiratory:  Negative for cough and shortness of breath.    Cardiovascular:  Positive for chest pain. Negative for leg swelling.   Gastrointestinal:  Negative for abdominal pain, nausea and vomiting.   Genitourinary:  Negative for dysuria, hematuria, vaginal bleeding and vaginal discharge.   Musculoskeletal:  Positive for back pain. Negative for joint swelling and neck pain.        Right hip pain. Right knee pain.    Skin:  Negative for rash.   Neurological:  Positive for dizziness and syncope.   Psychiatric/Behavioral:  Negative for confusion.        Physical Exam     Initial Vitals [12/03/24 1733]   BP Pulse Resp Temp SpO2   (!) 184/102 65 18 98 °F (36.7 °C) 99 %      MAP       --         Physical Exam    Nursing note and vitals reviewed.  Constitutional: She is not diaphoretic. No distress.   C-collar in place    HENT:   Head: Normocephalic and atraumatic.   Neck: Neck supple.   Normal range of motion.  Cardiovascular:  Normal rate and regular rhythm.           No murmur heard.  Pulmonary/Chest: Breath sounds normal. No respiratory distress.   Abdominal: Abdomen is soft. She exhibits no distension. There is no abdominal tenderness.   Musculoskeletal:      Cervical back: Normal range of motion and neck supple.      Comments: Right lower paraspinal tenderness. Lumbar tenderness, no step offs or deformities.      Neurological: She is alert and oriented to person, place, and time. She has normal strength. No cranial nerve deficit or sensory deficit.   Skin: Skin is warm. Capillary refill takes less than 2 seconds.   Psychiatric: She has a normal mood and affect.         ED Course   Procedures  Labs Reviewed    COMPREHENSIVE METABOLIC PANEL - Abnormal       Result Value    Sodium 141      Potassium 4.4      Chloride 109 (*)     CO2 23      Glucose 97      Blood Urea Nitrogen 23.4 (*)     Creatinine 1.13 (*)     Calcium 10.0      Protein Total 7.2      Albumin 3.8      Globulin 3.4      Albumin/Globulin Ratio 1.1      Bilirubin Total 0.4      ALP 97      ALT 5      AST 13      eGFR 53      Anion Gap 9.0      BUN/Creatinine Ratio 21     B-TYPE NATRIURETIC PEPTIDE - Abnormal    Natriuretic Peptide 316.3 (*)    CBC WITH DIFFERENTIAL - Abnormal    WBC 6.46      RBC 5.12      Hgb 11.1 (*)     Hct 34.9 (*)     MCV 68.2 (*)     MCH 21.7 (*)     MCHC 31.8 (*)     RDW 21.0 (*)     Platelet 242      MPV        Neut % 75.4      Lymph % 13.8      Mono % 9.3      Eos % 0.9      Basophil % 0.3      Lymph # 0.89      Neut # 4.87      Mono # 0.60      Eos # 0.06      Baso # 0.02      IG# 0.02      IG% 0.3      NRBC% 0.0      IPF 8.2     BLOOD SMEAR MICROSCOPIC EXAM (OLG) - Abnormal    RBC Morph Abnormal (*)     Anisocytosis 1+ (*)     Poikilocytosis 1+ (*)     Schistocytes 1+ (*)     Target Cells Slight (*)     Platelets Normal     TROPONIN I - Normal    Troponin-I <0.010     CBC W/ AUTO DIFFERENTIAL    Narrative:     The following orders were created for panel order CBC auto differential.  Procedure                               Abnormality         Status                     ---------                               -----------         ------                     CBC with Differential[[id number]]       Abnormal            Final result                 Please view results for these tests on the individual orders.   TYPE & SCREEN    Group & Rh A POS      Indirect Magan GEL NEG      Specimen Outdate 12/06/2024 23:59       EKG Readings: (Independently Interpreted)   Initial Reading: No STEMI. Rhythm: Normal Sinus Rhythm. Heart Rate: 63. Ectopy: No Ectopy. Conduction: Normal. ST Segments: Normal ST Segments. T Waves: Normal. Axis: Normal.    1738. No ischemic changes.       Imaging Results              CT Cervical Spine Without Contrast (Preliminary result)  Result time 12/03/24 20:50:08      Preliminary result by Ambrocio Sandhu Jr., MD (12/03/24 20:50:08)                   Narrative:    START OF REPORT:  Technique: CT of the cervical spine was performed without intravenous contrast with axial as well as sagittal and coronal images.    Comparison: None.    Dosage Information: Automated exposure control was utilized.    Clinical history: Fall, neck trauma.    Findings:  Lung apices: Nonspecific scarring but otherwise unremarkable.  Spine:  Spinal canal: The spinal canal appears unremarkable.  Rotation: No significant rotation is seen.  Scoliosis: No significant scoliosis is seen.  Vertebral Fusion: No vertebral fusion is identified.  Listhesis: There is grade I degenerative anterolisthesis of C2 on C3.  Lordosis: Moderate straightening of the cervical lordosis is seen. This may be positional or reflect an element of myospasm.  Intervertebral disc spaces: Multilevel loss of disc height is seen.  Osteophytes: Moderate multilevel endplate osteophytes are seen.  Endplate Sclerosis: Mild endplate sclerosis is seen off the opposing endplates at C7-T1.  Uncovertebral degenerative changes: Mild multilevel uncovertebral joint arthrosis is seen.  Facet degenerative changes: Mild multilevel facet degenerative changes are seen.  Fractures: No acute cervical spine fracture dislocation or subluxation is seen.    Miscellaneous:  Mastoid air cells: The visualized mastoid air cells appear clear.  Soft Tissues: Unremarkable.      Impression:  1. No acute cervical spine fracture dislocation or subluxation is seen.  2. Degenerative changes and other details as above.                                         CT Head Without Contrast (Preliminary result)  Result time 12/03/24 20:37:45      Preliminary result by Ambrocio Sandhu Jr., MD (12/03/24 20:37:45)                    Narrative:    START OF REPORT:  Technique: CT of the head was performed without intravenous contrast with axial as well as coronal and sagittal images.    Comparison: Comparison is with study dated 2023-08-03 02:56:59.    Dosage Information: Automated exposure control was utilized.    Clinical history: Fall head trauma.    Findings:  Hemorrhage: No acute intracranial hemorrhage is seen.  CSF spaces: The ventricles, sulci and basal cisterns all appear mildly prominent consistent with global cerebral atrophy.  Brain parenchyma: There is preservation of the grey white junction throughout. No acute infarct. Mild microvascular change is seen in portions of the periventricular and deep white matter tracts.  Cerebellum: Unremarkable.  Vascular: Unremarkable venous sinuses. Mild atheromatous calcification of the intracranial arteries is seen.  Sella and skull base: The sella appears to be within normal limits for age.  Cerebellopontine angles: Within normal limits.  Herniation: None.  Intracranial calcifications: Incidental note is made of bilateral choroid plexus calcification. Incidental note is made of some pineal region calcification. Incidental note is made of some calcification of the falx. Incidental note is made of bilateral basal ganglia calcifcation.  Calvarium: No acute linear or depressed skull fracture is seen.  Scalp: No scalp soft tissue swelling is seen.    Maxillofacial Structures:  Paranasal sinuses: There are nodular soft tissue density in the bilateral maxillary sinuses which may represent retentions cyst versus polyps.  Orbits: The orbits appear unremarkable.  Zygomatic arches: The zygomatic arches are intact and unremarkable.  Temporal bones and mastoids: The temporal bones and mastoids appear unremarkable.  TMJ: The mandibular condyles appear normally placed with respect to the mandibular fossa.      Impression:  1. No acute intracranial traumatic injury identified. Details and other  findings as noted above.                                         CT Chest Abdomen Pelvis With IV Contrast (XPD) NO Oral Contrast (In process)                      X-Ray Pelvis Routine AP (Final result)  Result time 12/03/24 18:48:08      Final result by Amelia Hayes MD (12/03/24 18:48:08)                   Impression:      There is no abnormality seen      Electronically signed by: Severino Hayes  Date:    12/03/2024  Time:    18:48               Narrative:    EXAMINATION:  XR PELVIS ROUTINE AP    CLINICAL HISTORY:  fall. R hip pain;    TECHNIQUE:  AP view of the pelvis was performed.    COMPARISON:  None.    FINDINGS:  The bones and joints are in good anatomic alignment.  No fracture is seen.  No dislocation is seen.  No soft tissue abnormality is seen.                                       X-Ray Chest AP Portable (Final result)  Result time 12/03/24 18:38:37      Final result by Amelia Hayes MD (12/03/24 18:38:37)                   Impression:      No evidence of acute trauma      Electronically signed by: Severino Hayes  Date:    12/03/2024  Time:    18:38               Narrative:    EXAMINATION:  XR CHEST AP PORTABLE    CLINICAL HISTORY:  Unspecified fall, initial encounter    TECHNIQUE:  Single frontal view of the chest was performed.    COMPARISON:  07/31/2024    FINDINGS:  There are chronic appearing lung changes seen bilaterally. No evidence of acute infiltrate is seen. No mass is seen. No lesion is seen. No pleural effusion is seen.  The heart is enlarged in size.  The aorta appears ectatic.  The bones and joints show no acute abnormality.                                       Medications   acetaminophen 1,000 mg/100 mL (10 mg/mL) injection 1,000 mg (0 mg Intravenous Stopped 12/3/24 1920)   iohexoL (OMNIPAQUE 350) injection 100 mL (100 mLs Intravenous Given 12/3/24 2002)     Medical Decision Making  69-year-old female - presenting with syncopal episode today walking into her house  walking to her car woke up on the ground  Complaining of back pain right hip pain  Exam as above  Initial workup negative  Will admit for syncopal workup      Differential diagnosis includes, but is not limited to:  Judging by the patient's chief complaint and pertinent history, the patient has the following possible differential diagnoses, including but not limited to the following.  Some of these are deemed to be lower likelihood and some more likely based on my physical exam and history combined with possible lab work and/or imaging studies.   Please see the pertinent studies, and refer to the HPI.  Some of these diagnoses will take further evaluation to fully rule out, perhaps as an outpatient and the patient was encouraged to follow up when discharged for more comprehensive evaluation.    Arrhythmia, dehydration, orthostatic episode, intracranial hemorrhage, seizure, anemia, infection, electrolyte derangement, drug use, hypoglycemic episode, ACS, PE, structural heart disease, AAA rupture, ectopic pregnancy, aortic dissection, CVA       Amount and/or Complexity of Data Reviewed  Labs: ordered.  Radiology: ordered and independent interpretation performed.  ECG/medicine tests: ordered and independent interpretation performed.     Details: 1738. Initial Reading: No STEMI. Rhythm: Normal Sinus Rhythm. Heart Rate: 63. Ectopy: No Ectopy. Conduction: Normal. ST Segments: Normal ST Segments. T Waves: Normal. Axis: Normal. No ischemic changes.     Risk  Prescription drug management.            Scribe Attestation:   Scribe #1: I performed the above scribed service and the documentation accurately describes the services I performed. I attest to the accuracy of the note.    Attending Attestation:           Physician Attestation for Scribe:  Physician Attestation Statement for Scribe #1: I, Jaspreet Sims MD, reviewed documentation, as scribed by Sandy Lema in my presence, and it is both accurate and complete.              ED Course as of 12/03/24 2339   Tue Dec 03, 2024   220[facility] medicine  [ED]   2203 Patient confirms that she had a true syncopal episode today does not remember falling woke up on the ground.  She was also been having exertional shortness of breath intermittent over the last several weeks supposed to get a workup with a cardiologist.  Given the nature of her story will require admission for a syncopal workup will call hospitalist for admission at this time [AC]   [address] - will admit [AC]      ED Course User Index  [AC] Jaspreet Sims IV, MD  [ED] Sandy Lema                             Clinical Impression:  Final diagnoses:  [W19.XXXA] Fall, initial encounter  [R55] Syncope, unspecified syncope type (Primary)          ED Disposition Condition    Admit                 Jaspreet Sims IV, MD  12/03/24 0455

## 2024-12-04 NOTE — H&P
[facility] Medicine History & Physical Examination       Patient Name: Valery Winchester  MRN: [id number]  Patient Class: IP- Inpatient   Admission Date: 12/3/2024   Admitting Physician: ARMANDO Service   Length of Stay: 1  Attending Physician: SMITA STREET MD  Primary Care Provider: Mallory Chavez NP  Face-to-Face encounter date: 12/04/2024  Code Status: Full code.  History source: Medical records, patient.     Documented PMH:  Anxiety, bronchial asthma, GERD, systemic HTN, dyslipidemia.    Reason for this time presentation on 12/04/2024  to ED:  Syncope, fall.    Screening for Social Drivers for health:  Patient screened for food insecurity, housing instability, transportation needs, utility difficulties, and interpersonal safety (select all that apply as identified as concern)  []Housing or Food  []Transportation Needs  []Utility Difficulties  []Interpersonal safety  [x]None      Patient information was obtained from patient, patient's family, past medical records and ER records.  ED records were reviewed in detail and documented below    HISTORY OF PRESENT ILLNESS:     Pertinent info includes:  69 y.o. female .  Lives at home herself.  At Baseline independent in ADLs.  Marginal historian.    Patient reported she was getting ready to go out of the house in a rush as she was running late and had to go baby sit her grandchildren, as she stepped out of the house she felt dizzy  and that's the last thing she remembered, thought she may have fallen into a nearby pole, she did notice some back pain, right hip and knee pain, had noticed some short-lived chest pain as well. Reports more lately she has been dizzi off and on and it has been ongoing for a while.  Denies otherwise be recently ill. Says her BP has been elevated for long time and more recently her nephrologist to added doxazosin and took her off hydralazine but says she had dizziness even before this BP meds change.  Says usually stays hydrated. Says has lately been stressed because of a family member passing away.  Denies tobacco use or excess alcohol use.  ROS is otherwise UR.     In the ED she was hypertensive, on RA . Notable workup included , troponin < 0.01.  CXR -ve for acute infiltrate/ consolidation.   XR pelvis reported no acute abnormality.  CTH -ve for acute findings.   CT c-spine -ve for acute fracture or subluxation.   CT C/A/P report burning..   The patient was admitted for further management.        PAST MEDICAL HISTORY:     Past Medical History:   Diagnosis Date    Anemia, unspecified     Essential (primary) hypertension        PAST SURGICAL HISTORY:     Past Surgical History:   Procedure Laterality Date    COLONOSCOPY  04/03/2019    ESOPHAGOGASTRODUODENOSCOPY  2023       ALLERGIES:   Lisinopril and Aspirin    FAMILY HISTORY:   Reviewed and negative    SOCIAL HISTORY:     Social History     Tobacco Use    Smoking status: Never     Passive exposure: Never    Smokeless tobacco: Never   Substance Use Topics    Alcohol use: Never        HOME MEDICATIONS:     Prior to Admission medications    Medication Sig Start Date End Date Taking? Authorizing Provider   albuterol (PROVENTIL/VENTOLIN HFA) 90 mcg/actuation inhaler Inhale 2 puffs into the lungs every 6 (six) hours as needed. Rescue   Yes Provider, Historical   doxazosin (CARDURA) 2 MG tablet Take 2 mg by mouth once daily.   Yes Provider, Historical   EScitalopram oxalate (LEXAPRO) 10 MG tablet TAKE ONE TABLET BY MOUTH ONCE A DAY FOR ANXIETY 8/1/23  Yes Provider, Historical   folic acid (FOLVITE) 1 MG tablet TAKE ONE TABLET BY MOUTH DAILY 4/23/24  Yes Katey Troy, NP   furosemide (LASIX) 40 MG tablet Take by mouth. 4/27/23  Yes Provider, Historical   gabapentin (NEURONTIN) 300 MG capsule Take 300 mg by mouth 3 (three) times daily.   Yes Provider, Historical   metoprolol succinate (TOPROL-XL) 100 MG 24 hr tablet Take 100 mg by mouth once daily.   Yes  Provider, Historical   pantoprazole (PROTONIX) 40 MG tablet Take 40 mg by mouth once daily.   Yes Provider, Historical   spironolactone (ALDACTONE) 25 MG tablet Take 25 mg by mouth once daily.   Yes Provider, Historical   AMLODIPINE BESYLATE, BULK, MISC 10 mg by Misc.(Non-Drug; Combo Route) route once daily.  Patient not taking: Reported on 10/24/2024    Provider, Historical   amoxicillin-clavulanate 875-125mg (AUGMENTIN) 875-125 mg per tablet Take 1 tablet by mouth 2 (two) times daily.  Patient not taking: Reported on 10/24/2024    Provider, Historical   aspirin (ECOTRIN) 81 MG EC tablet Take 81 mg by mouth once daily.  Patient not taking: Reported on 10/24/2024    Provider, Historical   busPIRone (BUSPAR) 7.5 MG tablet Take 7.5 mg by mouth 3 (three) times daily.  Patient not taking: Reported on 10/24/2024    Provider, Historical   FEROSUL 325 mg (65 mg iron) Tab tablet TAKE ONE TABLET BY MOUTH ONCE DAILY ON AN EMPTY STOMACH WITH A GLASS OF ORANGE JUICE OR 500MG VITAMIN C TABLET. MAY EAT AN HOUR LATER 5/12/23   Provider, Historical   ferrous sulfate 325 (65 FE) MG EC tablet Take 1 tablet (325 mg total) by mouth once daily. Take on empty stomach. May eat an hour later. Take with vit C (glass orange juice or 500 mg Vit C tablets). 5/29/24   Katey Troy, NP   hydrALAZINE (APRESOLINE) 25 MG tablet Take 25 mg by mouth. 9/17/24   Provider, Historical   mirtazapine (REMERON) 15 MG tablet Take 15 mg by mouth.    Provider, Historical   NIFEdipine (PROCARDIA-XL) 60 MG (OSM) 24 hr tablet Take 1 tablet by mouth every morning.  Patient not taking: Reported on 10/24/2024    Provider, Historical   PROCARDIA XL 60 mg 24 hr tablet Take 60 mg by mouth.  Patient not taking: Reported on 10/24/2024 4/27/23   Provider, Historical   promethazine (PHENERGAN) 12.5 MG Tab Take 12.5 mg by mouth.  Patient not taking: Reported on 10/24/2024 4/27/23   Provider, Historical   sucralfate (CARAFATE) 1 gram tablet Take 1 tablet (1 g total) by  mouth 4 (four) times daily.  Patient not taking: Reported on 10/24/2024 12/27/22   Hernandez Garcia MD   traZODone (DESYREL) 50 MG tablet Take 50 mg by mouth every evening.  Patient not taking: Reported on 10/24/2024 10/17/23   Provider, Historical   vitamin D (VITAMIN D3) 1000 units Tab Take 1,000 Units by mouth once daily.    Provider, Historical       REVIEW OF SYSTEMS:   Except as documented, all other systems reviewed and negative     PHYSICAL EXAM:     VITAL SIGNS: 24 HRS MIN & MAX LAST   Temp  Min: 98 °F (36.7 °C)  Max: 98 °F (36.7 °C) 98 °F (36.7 °C)   BP  Min: 151/84  Max: 186/94 (!) 151/84   Pulse  Min: 63  Max: 87  79   Resp  Min: 13  Max: 18 18   SpO2  Min: 96 %  Max: 100 % 100 %     General appearance   Alert, cooperative, no distress. Obese body habitus.   Head   Normocephalic, atraumatic   Eyes   Conjunctivae/corneas clear. PERRL, EOM's intact.    Nose  Nares normal. No drainage.   Throat  Lips, mucosa, and tongue normal.   Neck  Supple, thyroid no obvious abnormality. No appreciable JVD   Back    No CVA tenderness   Lungs    Vesicular breathing, B/l symmetric air entry, no wheezing, no crackles.   Chest wall   No tenderness.   Heart   Regular rate and rhythm, S1, S2 normal, no murmur, rub or gallop.    Abdomen    Soft, non-tender. Bowel sounds normal. No palpable masses or organomegaly   Extremities  Extremities normal, well perfused. Muscle tone is normal and equal bilaterally. No peripheral edema.    Pulses    Skin  No rashes or lesions   Neurologic  Alert and oriented, No apparent FNDs.          LABS AND IMAGING:     Recent Labs   Lab 12/03/24  1844   WBC 6.46   RBC 5.12   HGB 11.1*   HCT 34.9*   MCV 68.2*   MCH 21.7*   MCHC 31.8*   RDW 21.0*          Recent Labs   Lab 12/03/24  1844      K 4.4   *   CO2 23   BUN 23.4*   CREATININE 1.13*   CALCIUM 10.0   ALBUMIN 3.8   ALKPHOS 97   ALT 5   AST 13   BILITOT 0.4       Microbiology Results (last 7 days)       ** No results found for  the last 168 hours. **             CT Cervical Spine Without Contrast  START OF REPORT:  Technique: CT of the cervical spine was performed without intravenous contrast with axial as well as sagittal and coronal images.    Comparison: None.    Dosage Information: Automated exposure control was utilized.    Clinical history: Fall, neck trauma.    Findings:  Lung apices: Nonspecific scarring but otherwise unremarkable.  Spine:  Spinal canal: The spinal canal appears unremarkable.  Rotation: No significant rotation is seen.  Scoliosis: No significant scoliosis is seen.  Vertebral Fusion: No vertebral fusion is identified.  Listhesis: There is grade I degenerative anterolisthesis of C2 on C3.  Lordosis: Moderate straightening of the cervical lordosis is seen. This may be positional or reflect an element of myospasm.  Intervertebral disc spaces: Multilevel loss of disc height is seen.  Osteophytes: Moderate multilevel endplate osteophytes are seen.  Endplate Sclerosis: Mild endplate sclerosis is seen off the opposing endplates at C7-T1.  Uncovertebral degenerative changes: Mild multilevel uncovertebral joint arthrosis is seen.  Facet degenerative changes: Mild multilevel facet degenerative changes are seen.  Fractures: No acute cervical spine fracture dislocation or subluxation is seen.    Miscellaneous:  Mastoid air cells: The visualized mastoid air cells appear clear.  Soft Tissues: Unremarkable.    Impression:  1. No acute cervical spine fracture dislocation or subluxation is seen.  2. Degenerative changes and other details as above.  CT Head Without Contrast  START OF REPORT:  Technique: CT of the head was performed without intravenous contrast with axial as well as coronal and sagittal images.    Comparison: Comparison is with study dated 2023-08-03 02:56:59.    Dosage Information: Automated exposure control was utilized.    Clinical history: Fall head trauma.    Findings:  Hemorrhage: No acute intracranial  hemorrhage is seen.  CSF spaces: The ventricles, sulci and basal cisterns all appear mildly prominent consistent with global cerebral atrophy.  Brain parenchyma: There is preservation of the grey white junction throughout. No acute infarct. Mild microvascular change is seen in portions of the periventricular and deep white matter tracts.  Cerebellum: Unremarkable.  Vascular: Unremarkable venous sinuses. Mild atheromatous calcification of the intracranial arteries is seen.  Sella and skull base: The sella appears to be within normal limits for age.  Cerebellopontine angles: Within normal limits.  Herniation: None.  Intracranial calcifications: Incidental note is made of bilateral choroid plexus calcification. Incidental note is made of some pineal region calcification. Incidental note is made of some calcification of the falx. Incidental note is made of bilateral basal ganglia calcifcation.  Calvarium: No acute linear or depressed skull fracture is seen.  Scalp: No scalp soft tissue swelling is seen.    Maxillofacial Structures:  Paranasal sinuses: There are nodular soft tissue density in the bilateral maxillary sinuses which may represent retentions cyst versus polyps.  Orbits: The orbits appear unremarkable.  Zygomatic arches: The zygomatic arches are intact and unremarkable.  Temporal bones and mastoids: The temporal bones and mastoids appear unremarkable.  TMJ: The mandibular condyles appear normally placed with respect to the mandibular fossa.    Impression:  1. No acute intracranial traumatic injury identified. Details and other findings as noted above.  X-Ray Pelvis Routine AP  Narrative: EXAMINATION:  XR PELVIS ROUTINE AP    CLINICAL HISTORY:  fall. R hip pain;    TECHNIQUE:  AP view of the pelvis was performed.    COMPARISON:  None.    FINDINGS:  The bones and joints are in good anatomic alignment.  No fracture is seen.  No dislocation is seen.  No soft tissue abnormality is seen.  Impression: There is no  abnormality seen    Electronically signed by: Severino Hayes  Date:    12/03/2024  Time:    18:48  X-Ray Chest AP Portable  Narrative: EXAMINATION:  XR CHEST AP PORTABLE    CLINICAL HISTORY:  Unspecified fall, initial encounter    TECHNIQUE:  Single frontal view of the chest was performed.    COMPARISON:  07/31/2024    FINDINGS:  There are chronic appearing lung changes seen bilaterally. No evidence of acute infiltrate is seen. No mass is seen. No lesion is seen. No pleural effusion is seen.  The heart is enlarged in size.  The aorta appears ectatic.  The bones and joints show no acute abnormality.  Impression: No evidence of acute trauma    Electronically signed by: Severino Hayes  Date:    12/03/2024  Time:    18:38      ASSESSMENT & PLAN:     Syncope  Fall   On presentation:  , troponin < 0.01.  CXR -ve for acute infiltrate/ consolidation.   XR pelvis reported no acute abnormality.  CTH -ve for acute findings.   CT c-spine -ve for acute fracture or subluxation.   CT C/A/P report burning.  Pending work-up: Echo, carotid duplex, orthostatic vitals, UDS, ETOH levels.  Gentle IVF. Physical therapy eval requested.    Chronic conditions    Systemic HTN- continue metoprolol, doxazosin.  Holding Lasix,  spironolactone.  Dyslipidemia    Anxiety - continue escitalopram.  Bronchial asthma  GERD- continue PPI  Neuropathy- holding gabapentin.  Insomnia- continue Remeron.    Miscellaneous:   * Labs, imaging as well as medications reviewed.   * Nutrition: Heart healthy diet.  * Thromboprophylaxis: Consider therapies,  Lovenox, encourage ambulation as/ when tolerated.   * Stress ulcer prophylaxis: Not indicated.  * PTA Med list reviewed [when available], all or part of it may still be pending verification/ reconciliation.   *MDM complexity:  [x] High  * Envision effective transition to outpatient care.    ________________________________  INPATIENT LIST OF MEDICATIONS     Scheduled Meds:   enoxparin  40 mg Subcutaneous  Daily     Continuous Infusions:  PRN Meds:.  Current Facility-Administered Medications:     acetaminophen, 650 mg, Oral, Q6H PRN    aluminum-magnesium hydroxide-simethicone, 30 mL, Oral, QID PRN    hydrALAZINE, 10 mg, Intravenous, Q4H PRN    melatonin, 6 mg, Oral, Nightly PRN    naloxone, 0.02 mg, Intravenous, PRN    ondansetron, 4 mg, Intravenous, Q4H PRN    polyethylene glycol, 17 g, Oral, BID PRN    prochlorperazine, 5 mg, Intravenous, Q6H PRN    simethicone, 1 tablet, Oral, QID PRN

## 2024-12-04 NOTE — PT/OT/SLP EVAL
Physical Therapy Evaluation    Patient Name:  Valery Winchester   MRN:  [id number]    Recommendations:     Discharge therapy intensity: Low Intensity Therapy   Discharge Equipment Recommendations: walker, rolling   Barriers to discharge: Ongoing medical needs    Assessment:     Valery Winchester is a 69 y.o. female admitted with a medical diagnosis of syncopal episode. Prior to admit, patient lived alone in a [facility]. At baseline, she is independent and ambulates with a SPC.  She presents with the following impairments/functional limitations: weakness, impaired endurance, impaired self care skills, impaired functional mobility, gait instability, impaired balance, decreased safety awareness, pain. She required MIN A for bed mobility. Ambulated 30 ft with HHA. Noted unsteadiness. Limited by back pain. Patient to benefit from skilled PT to address deficits, improve functional mobility, and progress towards functional independence. Recommending low intensity therapy and a RW at discharge. Progress as tolerated.    Rehab Prognosis: Good; patient would benefit from acute skilled PT services to address these deficits and reach maximum level of function.    Recent Surgery: * No surgery found *      Plan:     During this hospitalization, patient would benefit from acute PT services 5 x/week to address the identified rehab impairments via gait training, therapeutic activities, therapeutic exercises, neuromuscular re-education and progress toward the following goals:    Plan of Care Expires:  01/04/25    Subjective     Chief Complaint: pain  Patient/Family Comments/goals: none  Pain/Comfort:  Pain Rating 1: 8/10  Location 1: back  Pain Addressed 1: Reposition, Distraction  Pain Rating Post-Intervention 1: 8/10    Patients cultural, spiritual, Cheondoism conflicts given the current situation: no    Living Environment:  Prior to admit, patient lived alone in a [facility]. At baseline, she is independent and ambulates with a SPC.  Equipment used at home: cane, straight.  DME owned (not currently used): none.  Upon discharge, patient will have assistance from TBD.    Objective:     Communicated with nurse prior to session.  Patient found supine with telemetry, peripheral IV  upon PT entry to room.    General Precautions: Standard, fall  Orthopedic Precautions:N/A   Braces: N/A  Respiratory Status: Room air    Exams:  Cognitive Exam:  Patient is oriented to Person, Place, Time, and Situation  Sensation: -       Intact  RLE ROM: WFL  RLE Strength: 4/5 grossly  LLE ROM: WFL  LLE Strength: 4/5 grossly  Skin integrity: Visible skin intact      Functional Mobility:  Bed Mobility:  Supine to Sit: minimum assistance  Sit to Supine: minimum assistance  Transfers:  Sit to Stand:  contact guard assistance with rolling walker  Gait: Ambulated 30 ft with HHA. Noted unsteadiness. Limited by back pain.   Balance: fair/poor      AM-PAC 6 CLICK MOBILITY  Total Score:17     Education Provided:  Role and goals of PT, transfer training, bed mobility, gait training, balance training, safety awareness, assistive device, strengthening exercises, and importance of participating in PT to return to PLOF.    Patient left HOB elevated with all lines intact, call button in reach, and transporter present.    GOALS:   Multidisciplinary Problems       Physical Therapy Goals          Problem: Physical Therapy    Goal Priority Disciplines Outcome Interventions   Physical Therapy Goal     PT, PT/OT Progressing    Description: Goals to be met by: 25     Patient will increase functional independence with mobility by performin. Supine to sit with [place]  2. Sit to supine with [place]  3. Sit to stand transfer with Modified [place]  4. Gait  x 300 feet with Modified [place] using Rolling Walker.                          History:     Past Medical History:   Diagnosis Date    Anemia, unspecified     Essential (primary) hypertension        Past Surgical  History:   Procedure Laterality Date    COLONOSCOPY  04/03/2019    ESOPHAGOGASTRODUODENOSCOPY  2023       Time Tracking:     PT Received On: 12/04/24  PT Start Time: 0750     PT Stop Time: 0810  PT Total Time (min): 20 min     Billable Minutes: Evaluation 20 minutes      12/04/2024

## 2024-12-04 NOTE — PLAN OF CARE
Rw ordered and referral sent to [facility] list provided to pt. She ask that  I call her daughter and discuss with her. I spoke to her daughter and she is fine with Hh setup  [facility] selected, will send referral     [facility] [place]  is requesting home address- [address], [facility] [facility]     Also requested the first 5 of ss #- info obtained and sent to  - [facility]

## 2024-12-04 NOTE — PROGRESS NOTES
[facility] Hospital Medicine Progress Note        Chief Complaint: Inpatient Follow-up     HPI:   69 year old woman with PMHx of HTN, HLD, TIA/CVA and anemia presented for syncope. Patient reported she was getting ready to go out of the house in a rush as she was running late and had to go baby sit her grandchildren, as she stepped out of the house she felt dizzy and that's the last thing she remembered, thought she may have fallen into a nearby pole, she did notice some back pain, right hip and knee pain, had noticed some short-lived chest pain as well. Reports more lately she has been dizzi off and on and it has been ongoing for a while.  Denies otherwise be recently ill. Says her BP has been elevated for long time and more recently her nephrologist to added doxazosin and took her off hydralazine but says she had dizziness even before this BP meds change. Says usually stays hydrated. Says has lately been stressed because of a family member passing away.     In the ED she was hypertensive to 184/102. Hgb 11.1. Cr 1.13. proBNP 316.3. Trops < 0.010. TSH negative and Utox/UA also negative. CXR was negative. Xray pelvis was also negative. CT C/A/P with IV contrast showed fractures of the left transverse processes of L2 and L3 - age indeterminate. CT head without any acute abnormality and CT C-spine without any acute abnormality. Admitted for syncope work up.     Interval Hx:   Admitted overnight. Denies chest pain, SOB, abdominal pain, N/V.    Case was discussed with patient's nurse and  on the floor.    Objective/physical exam:  General: In no acute distress, afebrile  Chest: Clear to auscultation bilaterally  Heart: RRR, +S1, S2, no appreciable murmur  Abdomen: Soft, nontender, BS +  MSK: Warm, no lower extremity edema, no clubbing or cyanosis  Neurologic: Alert and oriented x4, Cranial nerve II-XII intact, Strength 5/5 in all 4 extremities    VITAL SIGNS: 24 HRS MIN & MAX  LAST   Temp  Min: 97.9 °F (36.6 °C)  Max: 98.1 °F (36.7 °C) 98.1 °F (36.7 °C)   BP  Min: 121/75  Max: 186/94 121/75   Pulse  Min: 63  Max: 89  74   Resp  Min: 13  Max: 22 18   SpO2  Min: 94 %  Max: 100 % (!) 94 %     I have reviewed the following labs:  Recent Labs   Lab 12/03/24  1844 12/04/24  0359   WBC 6.46 6.87   RBC 5.12 4.78   HGB 11.1* 10.4*   HCT 34.9* 32.4*   MCV 68.2* 67.8*   MCH 21.7* 21.8*   MCHC 31.8* 32.1*   RDW 21.0* 20.6*    224     Recent Labs   Lab 12/03/24  1844 12/04/24  0359    139   K 4.4 4.1   * 109*   CO2 23 24   BUN 23.4* 21.4*   CREATININE 1.13* 1.09*   CALCIUM 10.0 9.7   MG  --  2.00   ALBUMIN 3.8 3.5   ALKPHOS 97 90   ALT 5 5   AST 13 14   BILITOT 0.4 0.5     Microbiology Results (last 7 days)       ** No results found for the last 168 hours. **             See below for Radiology    Assessment/Plan:  # Syncope 2/2 likely vasovagal  # Fractures of the left transverse processes of L2 and L3 - age indeterminate.  # Hx of anxiety, GERD, neuropathy, HTN, HLD, TIA/CVA, anemia    - orthostatics negative (but obtained after fluids)  - continue IVF  - TTE  - US carotid  - follow up Utox and ethanol level  - telemetry monitoring  - PT/OT  - holding lasix and aldactone  - continue metop XL and doxazosin  - continue home lexapro, remeron and gabapentin  - likely conservative management for the transverse process fracture - however given back pain and recent fall will consult NGSY    VTE prophylaxis: lovenox    Patient condition:  Stable    Anticipated discharge and Disposition:       All diagnosis and differential diagnosis have been reviewed; assessment and plan has been documented; I have personally reviewed the labs and test results that are presently available; I have reviewed the patients medication list; I have reviewed the consulting providers response and recommendations. I have reviewed or attempted to review medical records based upon their availability    All of the  patient's questions have been  addressed and answered. Patient's is agreeable to the above stated plan. I will continue to monitor closely and make adjustments to medical management as needed.    _____________________________________________________________________    Malnutrition Status:  Nutrition consulted. Most recent weight and BMI monitored-     Measurements:  Wt Readings from Last 1 Encounters:   12/03/24 97.5 kg (215 lb)   Body mass index is 39.32 kg/m².    Patient has been screened and assessed by RD.    Malnutrition Type:  Context:    Level:      Malnutrition Characteristic Summary:       Interventions/Recommendations (treatment strategy):        Scheduled Med:   doxazosin  2 mg Oral Daily    enoxparin  40 mg Subcutaneous Daily    EScitalopram oxalate  10 mg Oral Daily    ferrous sulfate  1 tablet Oral Daily    folic acid  1,000 mcg Oral Daily    gabapentin  300 mg Oral TID    metoprolol succinate  50 mg Oral Daily    mirtazapine  7.5 mg Oral QHS    pantoprazole  40 mg Oral Daily      Continuous Infusions:   0.9% NaCl   Intravenous Continuous 65 mL/hr at 12/04/24 0525 New Bag at 12/04/24 0525      PRN Meds:    Current Facility-Administered Medications:     acetaminophen, 650 mg, Oral, Q6H PRN    albuterol, 2 puff, Inhalation, Q6H PRN    aluminum-magnesium hydroxide-simethicone, 30 mL, Oral, QID PRN    hydrALAZINE, 10 mg, Intravenous, Q4H PRN    melatonin, 6 mg, Oral, Nightly PRN    naloxone, 0.02 mg, Intravenous, PRN    ondansetron, 4 mg, Intravenous, Q4H PRN    polyethylene glycol, 17 g, Oral, BID PRN    prochlorperazine, 5 mg, Intravenous, Q6H PRN    simethicone, 1 tablet, Oral, QID PRN     Radiology:  I have personally reviewed the following imaging and agree with the radiologist.     Echo    Left Ventricle: The left ventricle is normal in size. Normal wall   thickness. There is normal systolic function with a visually estimated   ejection fraction of 55 - 60%. There is normal diastolic function.     Right Ventricle: Right ventricular enlargement. Systolic function is   normal.    Right Atrium: Right atrium is dilated.    IVC/SVC: Normal venous pressure at 3 mmHg.  CT Chest Abdomen Pelvis With IV Contrast (XPD) NO Oral Contrast  Narrative: EXAMINATION:  CT CHEST ABDOMEN PELVIS WITH IV CONTRAST (XPD)    CLINICAL HISTORY:  Polytrauma, blunt;    TECHNIQUE:  Helical acquisition with axial, sagittal and coronal reformations obtained from the thoracic inlet to the pubic symphysis following the IV administration of contrast.    Automated tube current modulation, weight-based exposure dosing, and/or iterative reconstruction technique utilized to reach lowest reasonably achievable exposure rate.    DLP: 552 mGy*cm    COMPARISON:  CT abdomen pelvis 12/27/2022    FINDINGS:  BASE OF NECK: No significant abnormality.    HEART: Mild cardiomegaly.  There are coronary artery calcifications.    THORACIC VASCULATURE: No aortic aneurysm and no significant atherosclerosis.Pulmonary arteries enhance normally.    IVAN/MEDIASTINUM: No enlarged lymph nodes by size criteria.    AIRWAYS: Patent.    LUNGS/PLEURA: No lobar consolidation.  No pleural effusion or pneumothorax.    THORACIC SOFT TISSUES: Unremarkable.    LIVER: Too small to characterize hepatic hypodensities unchanged dating back to 2022 compatible with indolent lesions requiring no imaging follow-up.    BILIARY: No calcified gallstones.    PANCREAS: No inflammatory change.    SPLEEN: Normal in size    ADRENALS: No mass.    KIDNEYS/URETERS: Incidental renal cysts require no imaging follow-up.  No hydronephrosis.    GI TRACT/MESENTERY:  No evidence of bowel obstruction or inflammation.     Colonic diverticulosis without acute inflammatory change.    PERITONEUM: No free fluid.No free air.    LYMPH NODES: No enlarged lymph nodes by size criteria.    ABDOMINOPELVIC VASCULATURE: Mild aortoiliac atherosclerosis.    BLADDER: Normal appearance given degree of  distention.    REPRODUCTIVE ORGANS: Uterus is surgically absent.    ABDOMINAL WALL: Unremarkable.    BONES: Fractures of the left transverse processes at L2, L3. L5 hemangioma.  Impression: Fractures of the left transverse processes of L2 and L3, age-indeterminate    Electronically signed by: Shelly Archer  Date:    12/04/2024  Time:    08:55  CT Cervical Spine Without Contrast  Narrative: EXAMINATION:  CT CERVICAL SPINE WITHOUT CONTRAST    CLINICAL HISTORY:  Polytrauma, blunt;    TECHNIQUE:  CT of the cervical spine Without contrast. Sagittal and coronal reconstructions were performed on the source images.    Automatic exposure control was utilized to reduce the patient's radiation dose.    DLP = 1271    COMPARISON:  No prior imaging available for comparison.    FINDINGS:  Lung apices: Nonspecific scarring but otherwise unremarkable.Spine:Spinal canal: The spinal canal appears unremarkable.Rotation: No significant rotation is seen.Scoliosis: No significant scoliosis is seen.Vertebral Fusion: No vertebral fusion is identified.Listhesis: There is grade I degenerative anterolisthesis of C2 on C3.Lordosis: Moderate straightening of the cervical lordosis is seen. This may be positional or reflect an element of myospasm.Intervertebral disc spaces: Multilevel loss of disc height is seen.Osteophytes: Moderate multilevel endplate osteophytes are seen.Endplate Sclerosis: Mild endplate sclerosis is seen off the opposing endplates at C7-T1.Uncovertebral degenerative changes: Mild multilevel uncovertebral joint arthrosis is seen.Facet degenerative changes: Mild multilevel facet degenerative changes are seen.Fractures: No acute cervical spine fracture dislocation or subluxation is seen.Miscellaneous:Mastoid air cells: The visualized mastoid air cells appear clear.Soft Tissues: Unremarkable.  Impression: 1. No acute cervical spine abnormality identified.    2. Ligament, spinal cord and/or vascular abnormalities cannot be  excluded on the basis of this examination.    Degenerative changes as above.    Electronically signed by: Denzel Davis  Date:    12/04/2024  Time:    07:33  CT Head Without Contrast  Narrative: EXAMINATION:  CT HEAD WITHOUT CONTRAST    CLINICAL HISTORY:  Head trauma, minor (Age >= 65y);    TECHNIQUE:  Low dose axial images were obtained through the head.  Coronal and sagittal reformations were also performed. Contrast was not administered.    Automatic exposure control was utilized to reduce the patient's radiation dose.    DLP= 1271    COMPARISON:  08/03/2023    FINDINGS:  No acute intracranial hemorrhage, edema or mass. No acute parenchymal abnormality.    Diffuse cerebral atrophy with concordant ventricular enlargement.    There is normal gray white differentiation.    The osseous structures are normal.    The mastoid air cells are clear.    Debris within the right auditory canal.    The globes and orbital contents are normal bilaterally.    Mucous retention cyst in the bilateral maxillary sinuses.  Impression: No acute intracranial abnormality identified.  Findings of chronic microvascular ischemic disease.    Electronically signed by: Denzel Davis  Date:    12/04/2024  Time:    07:31      Rafa Mitchell MD  Department of Hospital Medicine   [facility]   12/04/2024

## 2024-12-04 NOTE — PLAN OF CARE
12/04/24 1021   Discharge Assessment   Assessment Type Discharge Planning Assessment   Confirmed/corrected address, phone number and insurance Yes   Confirmed Demographics Correct on Facesheet   Source of Information patient   When was your last doctors appointment? 11/18/24  (Mallory Cahvez)   Does patient/caregiver understand observation status Yes   Communicated SHAWNA with patient/caregiver Yes   Reason For Admission syncope   People in Home alone   Facility Arrived From: home   Do you expect to return to your current living situation? Yes   Do you have help at home or someone to help you manage your care at home? Yes   Who are your caregiver(s) and their phone number(s)? fmly   Prior to hospitilization cognitive status: Alert/Oriented   Current cognitive status: Alert/Oriented   Walking or Climbing Stairs Difficulty yes   Walking or Climbing Stairs ambulation difficulty, requires equipment   Mobility Management cane   Dressing/Bathing Difficulty no   Home Accessibility wheelchair accessible   Equipment Currently Used at Home cane, straight   Patient currently being followed by outpatient case management? No   Do you currently have service(s) that help you manage your care at home? No   Do you take prescription medications? Yes   Do you have any problems affording any of your prescribed medications? No   Is the patient taking medications as prescribed? yes   Who is going to help you get home at discharge? fmly   How do you get to doctors appointments? car, drives self   Are you on dialysis? No   Do you take coumadin? No   Discharge Plan A Home   Discharge Plan B Home   DME Needed Upon Discharge  none   Discharge Plan discussed with: Patient   Transition of Care Barriers None   Housing Stability   In the last 12 months, was there a time when you were not able to pay the mortgage or rent on time? N   At any time in the past 12 months, were you homeless or living in a shelter (including now)? N   Transportation  Needs   Has the lack of transportation kept you from medical appointments, meetings, work or from getting things needed for daily living? No   Food Insecurity   Within the past 12 months, you worried that your food would run out before you got the money to buy more. Never true   Within the past 12 months, the food you bought just didn't last and you didn't have money to get more. Never true   Utilities   In the past 12 months has the electric, gas, oil, or water company threatened to shut off services in your home? No     Pt lives alone but reports that her grandson is avail to help her when needed. She has a cane, no  services. Other needs TBD.

## 2024-12-05 VITALS
BODY MASS INDEX: 39.32 KG/M2 | DIASTOLIC BLOOD PRESSURE: 81 MMHG | RESPIRATION RATE: 20 BRPM | HEART RATE: 77 BPM | SYSTOLIC BLOOD PRESSURE: 137 MMHG | WEIGHT: 215 LBS | OXYGEN SATURATION: 95 % | TEMPERATURE: 98 F
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
## 2024-12-05 LAB
ANION GAP SERPL CALC-SCNC: 5 MEQ/L
BASOPHILS # BLD AUTO: 0.01 X10(3)/MCL
BASOPHILS NFR BLD AUTO: 0.2 %
BUN SERPL-MCNC: 24.2 MG/DL (ref 9.8–20.1)
CALCIUM SERPL-MCNC: 8.8 MG/DL (ref 8.4–10.2)
CHLORIDE SERPL-SCNC: 114 MMOL/L (ref 98–107)
CO2 SERPL-SCNC: 22 MMOL/L (ref 23–31)
CREAT SERPL-MCNC: 1.27 MG/DL (ref 0.55–1.02)
CREAT/UREA NIT SERPL: 19
EOSINOPHIL # BLD AUTO: 0.04 X10(3)/MCL (ref 0–0.9)
EOSINOPHIL NFR BLD AUTO: 0.9 %
ERYTHROCYTE [DISTWIDTH] IN BLOOD BY AUTOMATED COUNT: 21.1 % (ref 11.5–17)
GFR SERPLBLD CREATININE-BSD FMLA CKD-EPI: 46 ML/MIN/1.73/M2
GLUCOSE SERPL-MCNC: 103 MG/DL (ref 82–115)
HCT VFR BLD AUTO: 30.2 % (ref 37–47)
HGB BLD-MCNC: 9.6 G/DL (ref 12–16)
IMM GRANULOCYTES # BLD AUTO: 0.02 X10(3)/MCL (ref 0–0.04)
IMM GRANULOCYTES NFR BLD AUTO: 0.4 %
LYMPHOCYTES # BLD AUTO: 1.05 X10(3)/MCL (ref 0.6–4.6)
LYMPHOCYTES NFR BLD AUTO: 23.5 %
MCH RBC QN AUTO: 21.9 PG (ref 27–31)
MCHC RBC AUTO-ENTMCNC: 31.8 G/DL (ref 33–36)
MCV RBC AUTO: 68.8 FL (ref 80–94)
MONOCYTES # BLD AUTO: 0.49 X10(3)/MCL (ref 0.1–1.3)
MONOCYTES NFR BLD AUTO: 11 %
NEUTROPHILS # BLD AUTO: 2.85 X10(3)/MCL (ref 2.1–9.2)
NEUTROPHILS NFR BLD AUTO: 64 %
NRBC BLD AUTO-RTO: 0 %
NT-PROBNP SERPL IA-MCNC: 890 PG/ML
PLATELET # BLD AUTO: 199 X10(3)/MCL (ref 130–400)
PLATELETS.RETICULATED NFR BLD AUTO: 6.8 % (ref 0.9–11.2)
PMV BLD AUTO: ABNORMAL FL
POTASSIUM SERPL-SCNC: 4.5 MMOL/L (ref 3.5–5.1)
RBC # BLD AUTO: 4.39 X10(6)/MCL (ref 4.2–5.4)
SODIUM SERPL-SCNC: 141 MMOL/L (ref 136–145)
WBC # BLD AUTO: 4.46 X10(3)/MCL (ref 4.5–11.5)

## 2024-12-05 PROCEDURE — 36415 COLL VENOUS BLD VENIPUNCTURE: CPT

## 2024-12-05 PROCEDURE — 97530 THERAPEUTIC ACTIVITIES: CPT | Mod: CQ

## 2024-12-05 PROCEDURE — 25000003 PHARM REV CODE 250: Performed by: NURSE PRACTITIONER

## 2024-12-05 PROCEDURE — 25000003 PHARM REV CODE 250

## 2024-12-05 PROCEDURE — 80048 BASIC METABOLIC PNL TOTAL CA: CPT

## 2024-12-05 PROCEDURE — 97116 GAIT TRAINING THERAPY: CPT | Mod: CQ

## 2024-12-05 PROCEDURE — 25000003 PHARM REV CODE 250: Performed by: HOSPITALIST

## 2024-12-05 PROCEDURE — 85025 COMPLETE CBC W/AUTO DIFF WBC: CPT

## 2024-12-05 PROCEDURE — 63600175 PHARM REV CODE 636 W HCPCS: Performed by: NURSE PRACTITIONER

## 2024-12-05 RX ADMIN — GABAPENTIN 300 MG: 300 CAPSULE ORAL at 10:12

## 2024-12-05 RX ADMIN — DOXAZOSIN 2 MG: 1 TABLET ORAL at 10:12

## 2024-12-05 RX ADMIN — METOPROLOL SUCCINATE 50 MG: 50 TABLET, EXTENDED RELEASE ORAL at 10:12

## 2024-12-05 RX ADMIN — FOLIC ACID 1000 MCG: 1 TABLET ORAL at 10:12

## 2024-12-05 RX ADMIN — ACETAMINOPHEN 650 MG: 325 TABLET, FILM COATED ORAL at 11:12

## 2024-12-05 RX ADMIN — ESCITALOPRAM OXALATE 10 MG: 10 TABLET ORAL at 10:12

## 2024-12-05 RX ADMIN — GABAPENTIN 300 MG: 300 CAPSULE ORAL at 03:12

## 2024-12-05 RX ADMIN — ENOXAPARIN SODIUM 40 MG: 40 INJECTION SUBCUTANEOUS at 05:12

## 2024-12-05 RX ADMIN — PANTOPRAZOLE SODIUM 40 MG: 40 TABLET, DELAYED RELEASE ORAL at 10:12

## 2024-12-05 NOTE — PROGRESS NOTES
AVS and D/C instructions reviewed with patient and daughter, verbalizes understanding. IV and tele removed, VSS. D/C via W/C to ED exit in stable condition. Going home with walker, home health in place.

## 2024-12-05 NOTE — PLAN OF CARE
Problem: Adult Inpatient Plan of Care  Goal: Plan of Care Review  Outcome: Progressing  Flowsheets (Taken 12/5/2024 7300)  Plan of Care Reviewed With: patient  Goal: Absence of Hospital-Acquired Illness or Injury  Outcome: Progressing  Goal: Optimal Comfort and Wellbeing  Outcome: Progressing  Goal: Readiness for Transition of Care  Outcome: Progressing     Problem: Fall Injury Risk  Goal: Absence of Fall and Fall-Related Injury  Outcome: Progressing     Problem: Pain Acute  Goal: Optimal Pain Control and Function  Outcome: Progressing

## 2024-12-05 NOTE — PLAN OF CARE
12/05/24 1507   Final Note   Assessment Type Final Discharge Note   Anticipated Discharge Disposition Home-Health   Post-Acute Status   Post-Acute Authorization Home Health   Home Health Status Set-up Complete/Auth obtained   Discharge Delays None known at this time     Pt will dc to home with NSI      AVS and dc info sent to     RW given to pt

## 2024-12-05 NOTE — PLAN OF CARE
Problem: Adult Inpatient Plan of Care  Goal: Plan of Care Review  Outcome: Met  Goal: Patient-Specific Goal (Individualized)  Outcome: Met  Goal: Absence of Hospital-Acquired Illness or Injury  Outcome: Met  Goal: Optimal Comfort and Wellbeing  Outcome: Met  Goal: Readiness for Transition of Care  Outcome: Met     Problem: Physical Therapy  Goal: Physical Therapy Goal  Description: Goals to be met by: 25     Patient will increase functional independence with mobility by performin. Supine to sit with [place]  2. Sit to supine with [place]  3. Sit to stand transfer with Modified [place]  4. Gait  x 300 feet with Modified [place] using Rolling Walker.     Outcome: Met     Problem: Fall Injury Risk  Goal: Absence of Fall and Fall-Related Injury  Outcome: Met     Problem: Pain Acute  Goal: Optimal Pain Control and Function  Outcome: Met

## 2024-12-05 NOTE — CONSULTS
Imaging reviewed. Left TP fractures at L2 and L3.  No other traumatic abnormalities L spine. Stable alignment.  This finding is of no significance/no concerns re. Spinal stability.  No activity restrictions, no need for further imaging.    In general. Neurosurgical evaluation only warranted in presence of three or more, ipsilateral transverse process fractures.    Maik Duffy MD  Neurosurgery  [facility]

## 2024-12-05 NOTE — PT/OT/SLP PROGRESS
Physical Therapy Treatment    Patient Name:  Valery Winchester   MRN:  [id number]    Recommendations:     Discharge therapy intensity: Low Intensity Therapy   Discharge Equipment Recommendations: walker, rolling  Barriers to discharge: Impaired mobility    Assessment:     Valery Winchester is a 69 y.o. female admitted with a medical diagnosis of syncopal episode. Prior to admit, patient lived alone in a [facility]. At baseline, she is independent and ambulates with a SPC.  She presents with the following impairments/functional limitations: weakness, impaired endurance, impaired self care skills, impaired functional mobility, gait instability, impaired balance, decreased safety awareness, pain.    Rehab Prognosis: Good; patient would benefit from acute skilled PT services to address these deficits and reach maximum level of function.    Recent Surgery: * No surgery found *      Plan:     During this hospitalization, patient would benefit from acute PT services 5 x/week to address the identified rehab impairments via gait training, therapeutic activities, therapeutic exercises, neuromuscular re-education and progress toward the following goals:    Plan of Care Expires:  01/04/25    Subjective     Chief Complaint: L hip pain  Patient/Family Comments/goals: going home today  Pain/Comfort:  Pain Rating 1: 7/10  Location - Side 1: Left  Location 1: hip  Pain Addressed 1: Reposition, Cessation of Activity, Nurse notified  Pain Rating Post-Intervention 1: 8/10      Objective:     Communicated with pts nurse prior to session.  Patient found supine with peripheral IV, telemetry upon PT entry to room.     General Precautions: Standard, fall  Orthopedic Precautions: N/A  Braces: N/A  Respiratory Status: Room air  Blood Pressure: 136/83  Skin Integrity: Visible skin intact      Functional Mobility:  Bed Mobility:     Rolling Right: contact guard assistance  Scooting: minimum assistance  Supine to Sit: minimum  assistance  Transfers:     Sit to Stand:  contact guard assistance with rolling walker and from EOB and chair  Bed to Chair: contact guard assistance with  rolling walker  using  Step Transfer  Gait: The pt ambulated in room/bathroom w/ RW, negotiating doors, turning in small spaces and obstacle negotiation. Pt ambulated w/ RW, 74 ft, CG/SBA, demonstrating slight antalgic gait 2/2 hip pain.   Balance: No LOB during transfers or gait.  Occasional brief sharp L hip pain w/ certain movements w/o Lob.  Discussed w/ pt need for her to use RW due to pain and higher risk for falls.    Therapeutic Activities/Exercises:  Went over strategies for bed mobility to increase independence.  Pt states she sometimes has difficulty getting in and out.  Pt responded well to cues to improve bed mobility    Education:  Patient provided with verbal education and demonstrations education regarding PT role/goals/POC, fall prevention, and safety awareness.  Understanding was verbalized.     Patient left up in chair with all lines intact, call button in reach, and nurse notified    GOALS:   Multidisciplinary Problems       Physical Therapy Goals          Problem: Physical Therapy    Goal Priority Disciplines Outcome Interventions   Physical Therapy Goal     PT, PT/OT Progressing    Description: Goals to be met by: 25     Patient will increase functional independence with mobility by performin. Supine to sit with [place]  2. Sit to supine with [place]  3. Sit to stand transfer with Modified [place]  4. Gait  x 300 feet with Modified [place] using Rolling Walker.                          Time Tracking:     PT Received On: 24  PT Start Time: 937     PT Stop Time: 1005  PT Total Time (min): 28 min     Billable Minutes: Gait Training 13 and Therapeutic Activity 15 min    Treatment Type: Treatment  PT/PTA: PTA     Number of PTA visits since last PT visit: 2024

## 2024-12-06 ENCOUNTER — PATIENT OUTREACH (OUTPATIENT)
Dept: ADMINISTRATIVE | Facility: CLINIC | Age: 69
End: 2024-12-06
Payer: MEDICARE

## 2024-12-06 LAB
LEFT CCA DIST DIAS: 12 CM/S
LEFT CCA DIST SYS: 66 CM/S
LEFT CCA PROX DIAS: 17 CM/S
LEFT CCA PROX SYS: 94 CM/S
LEFT ECA DIAS: 10 CM/S
LEFT ECA SYS: 78 CM/S
LEFT ICA DIST DIAS: 16 CM/S
LEFT ICA DIST SYS: 62 CM/S
LEFT ICA MID DIAS: 17 CM/S
LEFT ICA MID SYS: 59 CM/S
LEFT ICA PROX DIAS: 16 CM/S
LEFT ICA PROX SYS: 67 CM/S
LEFT VERTEBRAL DIAS: 4 CM/S
LEFT VERTEBRAL SYS: 19 CM/S
OHS CV CAROTID RIGHT ICA EDV HIGHEST: 14
OHS CV CAROTID ULTRASOUND LEFT ICA/CCA RATIO: 1.02
OHS CV CAROTID ULTRASOUND RIGHT ICA/CCA RATIO: 0.82
OHS CV PV CAROTID LEFT HIGHEST CCA: 94
OHS CV PV CAROTID LEFT HIGHEST ICA: 67
OHS CV PV CAROTID RIGHT HIGHEST CCA: 96
OHS CV PV CAROTID RIGHT HIGHEST ICA: 59
OHS CV US CAROTID LEFT HIGHEST EDV: 17
RIGHT CCA DIST DIAS: 0 CM/S
RIGHT CCA DIST SYS: 72 CM/S
RIGHT CCA PROX DIAS: 12 CM/S
RIGHT CCA PROX SYS: 96 CM/S
RIGHT ECA DIAS: 6 CM/S
RIGHT ECA SYS: 74 CM/S
RIGHT ICA DIST DIAS: 14 CM/S
RIGHT ICA DIST SYS: 59 CM/S
RIGHT ICA MID DIAS: 13 CM/S
RIGHT ICA MID SYS: 51 CM/S
RIGHT ICA PROX DIAS: 12 CM/S
RIGHT ICA PROX SYS: 44 CM/S
RIGHT VERTEBRAL DIAS: 0 CM/S
RIGHT VERTEBRAL SYS: 36 CM/S

## 2024-12-06 NOTE — PROGRESS NOTES
C3 nurse spoke with Valery Winchester for a TCC post hospital discharge follow up call. The patient has a scheduled HOSFU appointment with Mallory Chavez NP  on 12/9/24 @ 0900.

## 2024-12-06 NOTE — DISCHARGE SUMMARY
[facility] Hospital Medicine Discharge Summary    Admit Date: 12/3/2024  Discharge Date and Time: 12/6/20242:46 PM  Admitting Physician:  Team  Discharging Physician: Liz Morales DO.  Primary Care Physician: Mallory Chavez NP  Consults: Neurosurgery    Discharge Diagnoses:  Syncope   Dizziness   Left TP fracture in L2 and L3, likely secondary to fall  Systemic hypertension, anxiety, GERD, neuropathy, insomnia    Hospital Course:   69 y.o. female .  Lives at home herself.  At Baseline independent in ADLs.  Marginal historian.    Patient reported she was getting ready to go out of the house in a rush as she was running late and had to go baby sit her grandchildren, as she stepped out of the house she felt dizzy  and that's the last thing she remembered, thought she may have fallen into a nearby pole, she did notice some back pain, right hip and knee pain, had noticed some short-lived chest pain as well. Reports more lately she has been dizzi off and on and it has been ongoing for a while.  Denies otherwise be recently ill. Says her BP has been elevated for long time and more recently her nephrologist to added doxazosin and took her off hydralazine but says she had dizziness even before this BP meds change. Says usually stays hydrated. Says has lately been stressed because of a family member passing away.  Denies tobacco use or excess alcohol use.  ROS is otherwise UR.      In the ED she was hypertensive, on RA . Notable workup included , troponin < 0.01.  CXR -ve for acute infiltrate/ consolidation.   XR pelvis reported no acute abnormality.  CTH -ve for acute findings.   CT c-spine -ve for acute fracture or subluxation.   CT C/A/P report burning..   The patient was admitted for further management.    Noted to have left TP fractures at L2 and L3.  Neurosurgery was consulted and recommended no surgery intervention in any for further imaging.     Patient's syncopal workup was  largely unremarkable.  Echo and carotid ultrasound was also negative.  We will stop her Cardura and hydralazine secondary to dizziness.  Patient needs to follow up with her PCP closely outpatient.  PT and OT worked with the patient and recommended home health.  Home health was arranged at discharge.  Labs and arrangements stable on day of discharge.  Patient denied any further concerns at this time    Pt was seen and examined on the day of discharge  Vitals:  VITAL SIGNS: 24 HRS MIN & MAX LAST   Temp  Min: 98.2 °F (36.8 °C)  Max: 98.2 °F (36.8 °C) 98.2 °F (36.8 °C)   BP  Min: 137/81  Max: 137/81 137/81   Pulse  Min: 77  Max: 77  77   No data recorded 20   SpO2  Min: 95 %  Max: 95 % 95 %       Physical Exam:  General appearance   Alert, cooperative, no distress. Obese body habitus.   Head   Normocephalic, atraumatic   Eyes   Conjunctivae/corneas clear. PERRL, EOM's intact.    Nose  Nares normal. No drainage.   Throat  Lips, mucosa, and tongue normal.   Neck  Supple, thyroid no obvious abnormality. No appreciable JVD   Back    No CVA tenderness   Lungs    Vesicular breathing, B/l symmetric air entry, no wheezing, no crackles.   Chest wall   No tenderness.   Heart   Regular rate and rhythm, S1, S2 normal, no murmur, rub or gallop.    Abdomen    Soft, non-tender. Bowel sounds normal. No palpable masses or organomegaly   Extremities  Extremities normal, well perfused. Muscle tone is normal and equal bilaterally. No peripheral edema.    Pulses     Skin  No rashes or lesions   Neurologic  Alert and oriented, No apparent FNDs.        Procedures Performed: No admission procedures for hospital encounter.     Significant Diagnostic Studies: See Full reports for all details    Recent Labs   Lab 12/03/24  1844 12/04/24  0359 12/05/24  0642   WBC 6.46 6.87 4.46*   RBC 5.12 4.78 4.39   HGB 11.1* 10.4* 9.6*   HCT 34.9* 32.4* 30.2*   MCV 68.2* 67.8* 68.8*   MCH 21.7* 21.8* 21.9*   MCHC 31.8* 32.1* 31.8*   RDW 21.0* 20.6* 21.1*   PLT  242 224 199       Recent Labs   Lab 12/03/24  1844 12/04/24  0359 12/05/24  0642    139 141   K 4.4 4.1 4.5   * 109* 114*   CO2 23 24 22*   BUN 23.4* 21.4* 24.2*   CREATININE 1.13* 1.09* 1.27*   CALCIUM 10.0 9.7 8.8   MG  --  2.00  --    ALBUMIN 3.8 3.5  --    ALKPHOS 97 90  --    ALT 5 5  --    AST 13 14  --    BILITOT 0.4 0.5  --         Microbiology Results (last 7 days)       ** No results found for the last 168 hours. **             Echo    Left Ventricle: The left ventricle is normal in size. Normal wall   thickness. There is normal systolic function with a visually estimated   ejection fraction of 55 - 60%. There is normal diastolic function.    Right Ventricle: Right ventricular enlargement. Systolic function is   normal.    Right Atrium: Right atrium is dilated.    IVC/SVC: Normal venous pressure at 3 mmHg.  CT Chest Abdomen Pelvis With IV Contrast (XPD) NO Oral Contrast  Narrative: EXAMINATION:  CT CHEST ABDOMEN PELVIS WITH IV CONTRAST (XPD)    CLINICAL HISTORY:  Polytrauma, blunt;    TECHNIQUE:  Helical acquisition with axial, sagittal and coronal reformations obtained from the thoracic inlet to the pubic symphysis following the IV administration of contrast.    Automated tube current modulation, weight-based exposure dosing, and/or iterative reconstruction technique utilized to reach lowest reasonably achievable exposure rate.    DLP: 552 mGy*cm    COMPARISON:  CT abdomen pelvis 12/27/2022    FINDINGS:  BASE OF NECK: No significant abnormality.    HEART: Mild cardiomegaly.  There are coronary artery calcifications.    THORACIC VASCULATURE: No aortic aneurysm and no significant atherosclerosis.Pulmonary arteries enhance normally.    IVAN/MEDIASTINUM: No enlarged lymph nodes by size criteria.    AIRWAYS: Patent.    LUNGS/PLEURA: No lobar consolidation.  No pleural effusion or pneumothorax.    THORACIC SOFT TISSUES: Unremarkable.    LIVER: Too small to characterize hepatic hypodensities  unchanged dating back to 2022 compatible with indolent lesions requiring no imaging follow-up.    BILIARY: No calcified gallstones.    PANCREAS: No inflammatory change.    SPLEEN: Normal in size    ADRENALS: No mass.    KIDNEYS/URETERS: Incidental renal cysts require no imaging follow-up.  No hydronephrosis.    GI TRACT/MESENTERY:  No evidence of bowel obstruction or inflammation.     Colonic diverticulosis without acute inflammatory change.    PERITONEUM: No free fluid.No free air.    LYMPH NODES: No enlarged lymph nodes by size criteria.    ABDOMINOPELVIC VASCULATURE: Mild aortoiliac atherosclerosis.    BLADDER: Normal appearance given degree of distention.    REPRODUCTIVE ORGANS: Uterus is surgically absent.    ABDOMINAL WALL: Unremarkable.    BONES: Fractures of the left transverse processes at L2, L3. L5 hemangioma.  Impression: Fractures of the left transverse processes of L2 and L3, age-indeterminate    Electronically signed by: Shelly Archer  Date:    12/04/2024  Time:    08:55  CT Cervical Spine Without Contrast  Narrative: EXAMINATION:  CT CERVICAL SPINE WITHOUT CONTRAST    CLINICAL HISTORY:  Polytrauma, blunt;    TECHNIQUE:  CT of the cervical spine Without contrast. Sagittal and coronal reconstructions were performed on the source images.    Automatic exposure control was utilized to reduce the patient's radiation dose.    DLP = 1271    COMPARISON:  No prior imaging available for comparison.    FINDINGS:  Lung apices: Nonspecific scarring but otherwise unremarkable.Spine:Spinal canal: The spinal canal appears unremarkable.Rotation: No significant rotation is seen.Scoliosis: No significant scoliosis is seen.Vertebral Fusion: No vertebral fusion is identified.Listhesis: There is grade I degenerative anterolisthesis of C2 on C3.Lordosis: Moderate straightening of the cervical lordosis is seen. This may be positional or reflect an element of myospasm.Intervertebral disc spaces: Multilevel loss of disc  height is seen.Osteophytes: Moderate multilevel endplate osteophytes are seen.Endplate Sclerosis: Mild endplate sclerosis is seen off the opposing endplates at C7-T1.Uncovertebral degenerative changes: Mild multilevel uncovertebral joint arthrosis is seen.Facet degenerative changes: Mild multilevel facet degenerative changes are seen.Fractures: No acute cervical spine fracture dislocation or subluxation is seen.Miscellaneous:Mastoid air cells: The visualized mastoid air cells appear clear.Soft Tissues: Unremarkable.  Impression: 1. No acute cervical spine abnormality identified.    2. Ligament, spinal cord and/or vascular abnormalities cannot be excluded on the basis of this examination.    Degenerative changes as above.    Electronically signed by: Denzel Davis  Date:    12/04/2024  Time:    07:33  CT Head Without Contrast  Narrative: EXAMINATION:  CT HEAD WITHOUT CONTRAST    CLINICAL HISTORY:  Head trauma, minor (Age >= 65y);    TECHNIQUE:  Low dose axial images were obtained through the head.  Coronal and sagittal reformations were also performed. Contrast was not administered.    Automatic exposure control was utilized to reduce the patient's radiation dose.    DLP= 1271    COMPARISON:  08/03/2023    FINDINGS:  No acute intracranial hemorrhage, edema or mass. No acute parenchymal abnormality.    Diffuse cerebral atrophy with concordant ventricular enlargement.    There is normal gray white differentiation.    The osseous structures are normal.    The mastoid air cells are clear.    Debris within the right auditory canal.    The globes and orbital contents are normal bilaterally.    Mucous retention cyst in the bilateral maxillary sinuses.  Impression: No acute intracranial abnormality identified.  Findings of chronic microvascular ischemic disease.    Electronically signed by: Denzel Davis  Date:    12/04/2024  Time:    07:31         Medication List        CONTINUE taking these medications      albuterol 90  mcg/actuation inhaler  Commonly known as: PROVENTIL/VENTOLIN HFA     EScitalopram oxalate 10 MG tablet  Commonly known as: LEXAPRO     * FeroSuL 325 mg (65 mg iron) Tab tablet  Generic drug: ferrous sulfate     * ferrous sulfate 325 (65 FE) MG EC tablet  Take 1 tablet (325 mg total) by mouth once daily. Take on empty stomach. May eat an hour later. Take with vit C (glass orange juice or 500 mg Vit C tablets).     folic acid 1 MG tablet  Commonly known as: FOLVITE  TAKE ONE TABLET BY MOUTH DAILY     gabapentin 300 MG capsule  Commonly known as: NEURONTIN     metoprolol succinate 100 MG 24 hr tablet  Commonly known as: TOPROL-XL     mirtazapine 15 MG tablet  Commonly known as: REMERON     pantoprazole 40 MG tablet  Commonly known as: PROTONIX     spironolactone 25 MG tablet  Commonly known as: ALDACTONE     vitamin D 1000 units Tab  Commonly known as: VITAMIN D3           * This list has 2 medication(s) that are the same as other medications prescribed for you. Read the directions carefully, and ask your doctor or other care provider to review them with you.                STOP taking these medications      doxazosin 2 MG tablet  Commonly known as: CARDURA     hydrALAZINE 25 MG tablet  Commonly known as: APRESOLINE               Explained in detail to the patient about the discharge plan, medications, and follow-up visits. Pt understands and agrees with the treatment plan  Discharge Disposition: Home-Health Care [facility] [place] [facility]   Discharged Condition: stable  Diet-    Medications Per DC med rec  Activities as tolerated   Follow-up Information       Mallory Chavez NP. Schedule an appointment as soon as possible for a visit in 1 week(s).    Specialty: Family Medicine  Contact information:  [address] [place] [address] [id number] [phone]               [facility] Follow up.    Specialties: Home Health Services, Home Therapy Services, Home Living Aide Services  Contact information:  [facility] Wendy Summers  [place] [address]  [phone]                         For further questions contact hospitalist office    Discharge time 33 minutes    For worsening symptoms, chest pain, shortness of breath, increased abdominal pain, high grade fever, stroke or stroke like symptoms, immediately go to the nearest Emergency Room or call 911 as soon as possible.      Liz Morales DO  Department of Hospital Medicine  [facility]  12/06/2024

## 2025-01-23 ENCOUNTER — LAB REQUISITION (OUTPATIENT)
Dept: LAB | Facility: HOSPITAL | Age: 70
End: 2025-01-23
Payer: MEDICARE

## 2025-01-23 DIAGNOSIS — D50.0 IRON DEFICIENCY ANEMIA SECONDARY TO BLOOD LOSS (CHRONIC): ICD-10-CM

## 2025-01-23 DIAGNOSIS — I12.9 HYPERTENSIVE CHRONIC KIDNEY DISEASE WITH STAGE 1 THROUGH STAGE 4 CHRONIC KIDNEY DISEASE, OR UNSPECIFIED CHRONIC KIDNEY DISEASE: ICD-10-CM

## 2025-01-23 DIAGNOSIS — N18.30 CHRONIC KIDNEY DISEASE, STAGE 3 UNSPECIFIED: ICD-10-CM

## 2025-01-23 DIAGNOSIS — R55 SYNCOPE AND COLLAPSE: ICD-10-CM

## 2025-01-23 DIAGNOSIS — I48.91 UNSPECIFIED ATRIAL FIBRILLATION: ICD-10-CM

## 2025-01-23 LAB
BASOPHILS # BLD AUTO: 0.03 X10(3)/MCL
BASOPHILS NFR BLD AUTO: 0.5 %
BILIRUB UR QL STRIP.AUTO: NEGATIVE
CLARITY UR: CLEAR
COLOR UR AUTO: NORMAL
CREAT UR-MCNC: 59.5 MG/DL (ref 45–106)
EOSINOPHIL # BLD AUTO: 0.08 X10(3)/MCL (ref 0–0.9)
EOSINOPHIL NFR BLD AUTO: 1.4 %
ERYTHROCYTE [DISTWIDTH] IN BLOOD BY AUTOMATED COUNT: 20.6 % (ref 11.5–17)
FERRITIN SERPL-MCNC: 86.8 NG/ML (ref 4.63–204)
FOLATE SERPL-MCNC: 36.8 NG/ML (ref 7–31.4)
GLUCOSE UR QL STRIP: NEGATIVE
HCT VFR BLD AUTO: 35.7 % (ref 37–47)
HGB BLD-MCNC: 11.4 G/DL (ref 12–16)
HGB UR QL STRIP: NEGATIVE
IMM GRANULOCYTES # BLD AUTO: 0.01 X10(3)/MCL (ref 0–0.04)
IMM GRANULOCYTES NFR BLD AUTO: 0.2 %
IRON SATN MFR SERPL: 24 % (ref 20–50)
IRON SERPL-MCNC: 71 UG/DL (ref 50–170)
KETONES UR QL STRIP: NEGATIVE
LEUKOCYTE ESTERASE UR QL STRIP: NEGATIVE
LYMPHOCYTES # BLD AUTO: 1.08 X10(3)/MCL (ref 0.6–4.6)
LYMPHOCYTES NFR BLD AUTO: 18.5 %
MCH RBC QN AUTO: 22.4 PG (ref 27–31)
MCHC RBC AUTO-ENTMCNC: 31.9 G/DL (ref 33–36)
MCV RBC AUTO: 70 FL (ref 80–94)
MONOCYTES # BLD AUTO: 0.42 X10(3)/MCL (ref 0.1–1.3)
MONOCYTES NFR BLD AUTO: 7.2 %
NEUTROPHILS # BLD AUTO: 4.23 X10(3)/MCL (ref 2.1–9.2)
NEUTROPHILS NFR BLD AUTO: 72.2 %
NITRITE UR QL STRIP: NEGATIVE
PH UR STRIP: 7 [PH]
PLATELET # BLD AUTO: 328 X10(3)/MCL (ref 130–400)
PMV BLD AUTO: ABNORMAL FL
PROT UR QL STRIP: NEGATIVE
PROT UR STRIP-MCNC: <6.8 MG/DL
RBC # BLD AUTO: 5.1 X10(6)/MCL (ref 4.2–5.4)
SP GR UR STRIP.AUTO: 1.01 (ref 1–1.03)
TIBC SERPL-MCNC: 221 UG/DL (ref 70–310)
TIBC SERPL-MCNC: 292 UG/DL (ref 250–450)
TRANSFERRIN SERPL-MCNC: 267 MG/DL (ref 173–360)
UROBILINOGEN UR STRIP-ACNC: 0.2
VIT B12 SERPL-MCNC: 339 PG/ML (ref 213–816)
WBC # BLD AUTO: 5.85 X10(3)/MCL (ref 4.5–11.5)

## 2025-01-23 PROCEDURE — 85025 COMPLETE CBC W/AUTO DIFF WBC: CPT | Performed by: INTERNAL MEDICINE

## 2025-01-23 PROCEDURE — 82570 ASSAY OF URINE CREATININE: CPT | Performed by: INTERNAL MEDICINE

## 2025-01-23 PROCEDURE — 82746 ASSAY OF FOLIC ACID SERUM: CPT | Performed by: INTERNAL MEDICINE

## 2025-01-23 PROCEDURE — 82607 VITAMIN B-12: CPT | Performed by: INTERNAL MEDICINE

## 2025-01-23 PROCEDURE — 81003 URINALYSIS AUTO W/O SCOPE: CPT | Performed by: INTERNAL MEDICINE

## 2025-01-23 PROCEDURE — 82728 ASSAY OF FERRITIN: CPT | Performed by: INTERNAL MEDICINE

## 2025-01-23 PROCEDURE — 83550 IRON BINDING TEST: CPT | Performed by: INTERNAL MEDICINE

## 2025-01-23 PROCEDURE — 87077 CULTURE AEROBIC IDENTIFY: CPT | Performed by: INTERNAL MEDICINE

## 2025-01-23 PROCEDURE — 80069 RENAL FUNCTION PANEL: CPT | Performed by: INTERNAL MEDICINE

## 2025-01-25 LAB — BACTERIA UR CULT: ABNORMAL

## 2025-01-27 LAB
ALBUMIN SERPL-MCNC: 3.8 G/DL (ref 3.4–4.8)
BUN SERPL-MCNC: 23 MG/DL (ref 9.8–20.1)
CALCIUM SERPL-MCNC: 9.7 MG/DL (ref 8.4–10.2)
CHLORIDE SERPL-SCNC: 105 MMOL/L (ref 98–107)
CO2 SERPL-SCNC: 22 MMOL/L (ref 23–31)
CREAT SERPL-MCNC: 1.32 MG/DL (ref 0.55–1.02)
GFR SERPLBLD CREATININE-BSD FMLA CKD-EPI: 44 ML/MIN/1.73/M2
GLUCOSE SERPL-MCNC: 120 MG/DL (ref 82–115)
PHOSPHATE SERPL-MCNC: 3.3 MG/DL (ref 2.3–4.7)
POTASSIUM SERPL-SCNC: 4.9 MMOL/L (ref 3.5–5.1)
SODIUM SERPL-SCNC: 138 MMOL/L (ref 136–145)

## 2025-01-28 ENCOUNTER — OFFICE VISIT (OUTPATIENT)
Dept: HEMATOLOGY/ONCOLOGY | Facility: CLINIC | Age: 70
End: 2025-01-28
Payer: MEDICARE

## 2025-01-28 DIAGNOSIS — E53.8 FOLIC ACID DEFICIENCY: ICD-10-CM

## 2025-01-28 DIAGNOSIS — D50.9 IRON DEFICIENCY ANEMIA, UNSPECIFIED IRON DEFICIENCY ANEMIA TYPE: Primary | ICD-10-CM

## 2025-01-28 RX ORDER — AMLODIPINE BESYLATE 5 MG/1
5 TABLET ORAL
COMMUNITY
Start: 2024-12-19

## 2025-01-28 NOTE — PROGRESS NOTES
A total of  30 minutes were spent in review of records, interpretation of test, coordination of care, discussion and counseling with the patient.        Portions of the record may have been created with voice recognition software. Occasional wrong-word or sound-a-like substitutions may have occurred due to the inherent limitations of voice recognition software. Read the chart carefully and recognize, using context, where substitutions have occurred.  Audio Only Telehealth Visit     The patient location is: Home  The chief complaint leading to consultation is:Lab Review   Visit type: Virtual visit with audio only (telephone)  Total time spent in medical discussion with patient: 10 minutes  Total time spent on date of the encounter 30 minutes       The reason for the audio only service rather than synchronous audio and video virtual visit was related to technical difficulties or patient preference/necessity.       Each patient to whom I provide medical services by telemedicine is:  (1) informed of the relationship between the physician and patient and the respective role of any other health care provider with respect to management of the patient; and (2) notified that they may decline to receive medical services by telemedicine and may withdraw from such care at any time. Patient verbally consented to receive this service via voice-only telephone call.       HPI: HPI:      67 y/o F w/ PMHx of HTN, HLD, anemia, GERD, depression referred to Hematology at [facility] for anemia    EGD 4/2/19 unremarkable. FOBT at same time positive  Colonoscopy 4/3/19 found nonthrombosed internal hemorrhoids, diverticulosis in sigmoid and descending colon but no clear source of blood loss.  Nuclear medicine bleeding scan showed equivocal focus of GI bleeding in mid small bowel loops in left lower quadrant  M2A capsule endoscopy showed a few small erosions in terminal ileum but otherwise no gross blood loss in whole small bowel.           Interval History:  1/28/2025:  Patient tolerated Venofer x5 doses in November and had marked improvement in energy.  Unfortunately she had a subsequent fall with fracture of her hip and back.  She continues on folic acid and no oral iron.  Her labs from January 23rd 2025 show hemoglobin 11.4 MCV 70 platelets 328 iron 71 ferritin 86 folic acid 36.8 vitamin B12 339 and% sat 24    10/24/24:   She had labs done on October 15, 2024 that showed hemoglobin 11.1 creatinine 1.57 platelets 331 ferritin 25 iron 67% sat 19.  She states that she has not been compliant with her oral iron due to continued constipation.  Currently she is working with Cardiology and Nephrology in terms of managing her blood pressure.  She has continued fatigue.  She denies any bright red blood per rectum or dark stools.     Assessment and plan:   1. Iron-deficiency  2. Hemoglobinopathy, hemoglobin H with alpha Thal trait  3. Folic acid deficiency        Plan   1/28/25  Continue folic acid daily  No oral iron due to constipation  Follow up in 10-12 weeks with iron studies and folic acid levels    I reviewed labs in detail with patient and she has continued iron-deficiency.  Her anemia is likely contributed by alpha thal and chronic kidney disease and I do not believe it will be better than 12 at any point.  In the future for creatinine worsens or other chronic conditions make her iron less than 10 she may be a candidate for Procrit.  At this time due to her severe constipation from iron pills and inconsistency with taking it due to symptoms I recommend she receive Venofer 20 mg x 6 doses.  We will recheck her iron levels 1 week after infusion and then again in 3 months.  She can follow up at that time.  Risks/benefits/alternatives of infusion were discussed with patient in detail                           This service was not originating from a related E/M service provided within the previous 7 days nor will  to an E/M service or  procedure within the next 24 hours or my soonest available appointment.  Prevailing standard of care was able to be met in this audio-only visit.

## 2025-05-27 ENCOUNTER — TELEPHONE (OUTPATIENT)
Dept: HEMATOLOGY/ONCOLOGY | Facility: CLINIC | Age: 70
End: 2025-05-27
Payer: MEDICARE

## 2025-05-27 NOTE — TELEPHONE ENCOUNTER
Called patient about labs and appointment for 5/29/25. Patient confirmed appointment and stated she did her labs at her PCP Mallory Chavez office.

## 2025-05-28 NOTE — PROGRESS NOTES
HPI: HPI:      67 y/o F w/ PMHx of HTN, HLD, anemia, GERD, depression referred to Hematology at [facility] for anemia    EGD 4/2/19 unremarkable. FOBT at same time positive  Colonoscopy 4/3/19 found nonthrombosed internal hemorrhoids, diverticulosis in sigmoid and descending colon but no clear source of blood loss.  Nuclear medicine bleeding scan showed equivocal focus of GI bleeding in mid small bowel loops in left lower quadrant  M2A capsule endoscopy showed a few small erosions in terminal ileum but otherwise no gross blood loss in whole small bowel.          Interval History:  5/29/2025:  Patient here for follow up.  Since her last visit she has been admitted to the hospital with chest pain.  She has noticed worsening fatigue and shortness of breath.  She is having some financial issues and some home issues with her son and grandson and is scared of being evicted.  She has had some suicidal thoughts but no plan and is speaking with her insurance about seeking therapy.  She states that she has a good question and would not ever hurt herself intentionally but due to financial issues sometimes wonders if her life would be better if she was not alive.    1/28/2025:  Patient tolerated Venofer x5 doses in November and had marked improvement in energy.  Unfortunately she had a subsequent fall with fracture of her hip and back.  She continues on folic acid and no oral iron.  Her labs from January 23rd 2025 show hemoglobin 11.4 MCV 70 platelets 328 iron 71 ferritin 86 folic acid 36.8 vitamin B12 339 and% sat 24     10/24/24:   She had labs done on October 15, 2024 that showed hemoglobin 11.1 creatinine 1.57 platelets 331 ferritin 25 iron 67% sat 19.  She states that she has not been compliant with her oral iron due to continued constipation.  Currently she is working with Cardiology and Nephrology in terms of managing her blood pressure.  She has continued fatigue.  She denies any bright red blood per rectum or dark  stools.      Past Medical History:   Diagnosis Date    Anemia, unspecified     Essential (primary) hypertension        Past Surgical History:   Procedure Laterality Date    COLONOSCOPY  04/03/2019    ESOPHAGOGASTRODUODENOSCOPY  2023       Family History   Problem Relation Name Age of Onset    Hypertension Mother      Hypertension Father      Liver disease Father      Asthma Sister         Social History     Socioeconomic History    Marital status:    Tobacco Use    Smoking status: Never     Passive exposure: Never    Smokeless tobacco: Never   Substance and Sexual Activity    Alcohol use: Yes     Comment: occasionally    Drug use: Never    Sexual activity: Never     Social Drivers of Health     Financial Resource Strain: Patient Declined (12/14/2024)    Received from [facility] Its [facility] and Affiliates    Overall Financial Resource Strain (CARDIA)     Difficulty of Paying Living Expenses: Patient declined   Food Insecurity: Patient Declined (12/14/2024)    [facility] and Its Subsidiaries and Affiliates    Hunger Vital Sign     Worried About Running Out of Food in the Last Year: Patient declined     Ran Out of Food in the Last Year: Patient declined   Transportation Needs: Patient Declined (12/14/2024)    Received from [facility] and Its Subsidiaries and Affiliates    PRAPARE - Transportation     Lack of Transportation (Medical): Patient declined     Lack of Transportation (Non-Medical): Patient declined   Housing Stability: Unknown (12/14/2024)    Received from [facility] and Its Sub[facility] and Affiliates    Housing Stability Vital Sign     Unable to Pay for Housing in the Last Year: Patient declined     Number of Times Moved in the Last Year: 1       Current Medications[1]    Review of patient's allergies indicates:   Allergen  Reactions    Lisinopril      Other reaction(s): angio adema    Aspirin Nausea Only     Other Reaction(s): Not Indicated           Review of systems  CONSTITUTIONAL: no fevers, no chills, no weight loss, no fatigue, no weakness  HEMATOLOGIC: no abnormal bleeding, no abnormal bruising, no drenching night sweats  ONCOLOGIC: no new masses or lumps  HEENT: no vision loss, no tinnitus or hearing loss, no nose bleeding, no dysphagia, no odynophagia  CVS: no chest pain, no palpitations, no dyspnea on exertion  RESP: no shortness of breath, no hemoptysis, no cough  GI: no nausea, no vomiting, no diarrhea, no constipation, no melena, no hematochezia, no hematemesis, no abdominal pain, no increase in abdominal girth  : no dysuria, no hematuria, no hesitancy, no scrotal swelling, no discharge  INTEGUMENT: no rashes, no abnormal bruising, no nail pitting, no hyperpigmentation  NEURO: no falls, no memory loss, no paresthesias or dysesthesias, no urofecal incontinence or retention, no loss of strength on any extremity  MSK: no back pain, no new joint pain, no joint swelling  PSYCH: no suicidal or homicidal ideation, no depression, no insomnia, no anhedonia  ENDOCRINE: no heat or cold intolerance, no polyuria, no polydipsia      Physical Exam:  Vitals:    05/29/25 0935   BP: 137/81   Pulse: 65   Resp: 16   Temp: 98.1 °F (36.7 °C)       ECOG PS 0  GA: AAOx3, NAD  HEENT: NCAT, PERRLA, EOMI, good dentition, moist oral mucous membranes  LYMPH: no cervical, axillary or supraclavicular adenopathy  CVS: s1s2 RRR, no M/R/G  RESP: CTA b/l, no crackles, no wheezes or rhonchi  ABD: soft, NT, ND, BS+, no hepatosplenomegaly  EXT: no deformities, no pedal edema  SKIN: no rashes, warm and dry  NEURO: normal mentation, strength 5/5 on all 4 extremities, no sensory deficits     LABS:  05/22/2025 % sat 16 iron 56 white blood cell count 5700 hemoglobin 10.9 MCV 72.4 platelets 328 ferritin 29 folic acid greater than 20    01/23/2025  % sat 24 iron  71 ferritin 86 white blood cell count 5850 hemoglobin 11.4 MCV 70 platelets 328  RADIOLOGY:  None  PATHOLOGY  None  Assessment and plan:   1. Iron-deficiency  2. Hemoglobinopathy, hemoglobin H with alpha Thal trait  3. Folic acid deficiency        Plan   05/29/2025  Continue folic acid  Venofer 200 mg IV x5 doses  Long discussion about her depression and I do not believe patient we will harm herself.  She is actively seeking help and does not have any plan of action for suicide  We will call her in 1-2 weeks  Follow up in 8 weeks with iron studies      1/28/25  Continue folic acid daily  No oral iron due to constipation  Follow up in 10-12 weeks with iron studies and folic acid levels     I reviewed labs in detail with patient and she has continued iron-deficiency.  Her anemia is likely contributed by alpha thal and chronic kidney disease and I do not believe it will be better than 12 at any point.  In the future for creatinine worsens or other chronic conditions make her iron less than 10 she may be a candidate for Procrit.  At this time due to her severe constipation from iron pills and inconsistency with taking it due to symptoms I recommend she receive Venofer 200mg x 6 doses.  We will recheck her iron levels 1 week after infusion and then again in 3 months.  She can follow up at that time.  Risks/benefits/alternatives of infusion were discussed with patient in detail        A total of  30 minutes were spent in review of records, interpretation of test, coordination of care, discussion and counseling with the patient.        Portions of the record may have been created with voice recognition software. Occasional wrong-word or sound-a-like substitutions may have occurred due to the inherent limitations of voice recognition software. Read the chart carefully and recognize, using context, where substitutions have occurred.           [1]   Current Outpatient Medications   Medication Sig Dispense Refill     albuterol (PROVENTIL/VENTOLIN HFA) 90 mcg/actuation inhaler Inhale 2 puffs into the lungs every 6 (six) hours as needed. Rescue      amLODIPine (NORVASC) 5 MG tablet Take 5 mg by mouth.      apixaban (ELIQUIS) 5 mg Tab Take 5 mg by mouth.      EScitalopram oxalate (LEXAPRO) 10 MG tablet TAKE ONE TABLET BY MOUTH ONCE A DAY FOR ANXIETY      FEROSUL 325 mg (65 mg iron) Tab tablet TAKE ONE TABLET BY MOUTH ONCE DAILY ON AN EMPTY STOMACH WITH A GLASS OF ORANGE JUICE OR 500MG VITAMIN C TABLET. MAY EAT AN HOUR LATER (Patient not taking: Reported on 1/28/2025)      ferrous sulfate 325 (65 FE) MG EC tablet Take 1 tablet (325 mg total) by mouth once daily. Take on empty stomach. May eat an hour later. Take with vit C (glass orange juice or 500 mg Vit C tablets). (Patient not taking: Reported on 1/28/2025) 30 tablet 11    folic acid (FOLVITE) 1 MG tablet TAKE ONE TABLET BY MOUTH DAILY 30 tablet 6    gabapentin (NEURONTIN) 300 MG capsule Take 300 mg by mouth 3 (three) times daily.      metoprolol succinate (TOPROL-XL) 100 MG 24 hr tablet Take 100 mg by mouth once daily.      mirtazapine (REMERON) 15 MG tablet Take 15 mg by mouth. (Patient not taking: Reported on 1/28/2025)      pantoprazole (PROTONIX) 40 MG tablet Take 40 mg by mouth once daily.      spironolactone (ALDACTONE) 25 MG tablet Take 25 mg by mouth once daily.      vitamin D (VITAMIN D3) 1000 units Tab Take 1,000 Units by mouth once daily. (Patient not taking: Reported on 1/28/2025)       No current facility-administered medications for this visit.

## 2025-05-29 ENCOUNTER — OFFICE VISIT (OUTPATIENT)
Dept: HEMATOLOGY/ONCOLOGY | Facility: CLINIC | Age: 70
End: 2025-05-29
Payer: MEDICARE

## 2025-05-29 VITALS
TEMPERATURE: 98 F | HEART RATE: 65 BPM | DIASTOLIC BLOOD PRESSURE: 81 MMHG | OXYGEN SATURATION: 98 % | HEIGHT: 62 IN | WEIGHT: 218.31 LBS | RESPIRATION RATE: 16 BRPM | BODY MASS INDEX: 40.17 KG/M2 | SYSTOLIC BLOOD PRESSURE: 137 MMHG

## 2025-05-29 DIAGNOSIS — D50.9 IRON DEFICIENCY ANEMIA, UNSPECIFIED IRON DEFICIENCY ANEMIA TYPE: Primary | ICD-10-CM

## 2025-05-29 PROCEDURE — 1101F PT FALLS ASSESS-DOCD LE1/YR: CPT | Mod: CPTII,,, | Performed by: INTERNAL MEDICINE

## 2025-05-29 PROCEDURE — 1159F MED LIST DOCD IN RCRD: CPT | Mod: CPTII,,, | Performed by: INTERNAL MEDICINE

## 2025-05-29 PROCEDURE — 3008F BODY MASS INDEX DOCD: CPT | Mod: CPTII,,, | Performed by: INTERNAL MEDICINE

## 2025-05-29 PROCEDURE — 3075F SYST BP GE 130 - 139MM HG: CPT | Mod: CPTII,,, | Performed by: INTERNAL MEDICINE

## 2025-05-29 PROCEDURE — 3079F DIAST BP 80-89 MM HG: CPT | Mod: CPTII,,, | Performed by: INTERNAL MEDICINE

## 2025-05-29 PROCEDURE — 1125F AMNT PAIN NOTED PAIN PRSNT: CPT | Mod: CPTII,,, | Performed by: INTERNAL MEDICINE

## 2025-05-29 PROCEDURE — 99214 OFFICE O/P EST MOD 30 MIN: CPT | Mod: ,,, | Performed by: INTERNAL MEDICINE

## 2025-05-29 PROCEDURE — 3288F FALL RISK ASSESSMENT DOCD: CPT | Mod: CPTII,,, | Performed by: INTERNAL MEDICINE

## 2025-05-29 RX ORDER — FUROSEMIDE 40 MG/1
40 TABLET ORAL 2 TIMES DAILY
COMMUNITY

## 2025-05-29 RX ORDER — ACETAMINOPHEN 500 MG/1
500 CAPSULE, LIQUID FILLED ORAL EVERY 6 HOURS PRN
COMMUNITY

## 2025-05-29 RX ORDER — EPINEPHRINE 0.3 MG/.3ML
0.3 INJECTION SUBCUTANEOUS ONCE AS NEEDED
OUTPATIENT
Start: 2025-06-05

## 2025-05-29 RX ORDER — SODIUM CHLORIDE 0.9 % (FLUSH) 0.9 %
10 SYRINGE (ML) INJECTION
OUTPATIENT
Start: 2025-06-05

## 2025-05-29 RX ORDER — HEPARIN 100 UNIT/ML
500 SYRINGE INTRAVENOUS
OUTPATIENT
Start: 2025-06-05

## 2025-05-29 RX ORDER — AMLODIPINE BESYLATE 10 MG/1
10 TABLET ORAL EVERY MORNING
COMMUNITY

## 2025-05-29 RX ORDER — SPIRONOLACTONE 50 MG/1
50 TABLET, FILM COATED ORAL
COMMUNITY

## 2025-06-03 ENCOUNTER — TELEPHONE (OUTPATIENT)
Dept: HEMATOLOGY/ONCOLOGY | Facility: CLINIC | Age: 70
End: 2025-06-03
Payer: MEDICARE

## 2025-06-11 ENCOUNTER — TELEPHONE (OUTPATIENT)
Dept: HEMATOLOGY/ONCOLOGY | Facility: CLINIC | Age: 70
End: 2025-06-11
Payer: MEDICARE

## 2025-06-11 NOTE — TELEPHONE ENCOUNTER
I spoke with patient on 6/03/2025. This is a late entry. Emotionally she feels depressed. She is not interested in coming in for an appointment, however, is interested in a phone call. I scheduled her for phone contact.

## 2025-06-12 ENCOUNTER — TELEPHONE (OUTPATIENT)
Dept: HEMATOLOGY/ONCOLOGY | Facility: CLINIC | Age: 70
End: 2025-06-12
Payer: MEDICARE

## 2025-06-12 NOTE — TELEPHONE ENCOUNTER
Pt reports was able to get Iron Patches from [facility]. Does she still need IV Iron?--SC, LPN

## 2025-06-13 NOTE — TELEPHONE ENCOUNTER
Patient called and asked if she needs to do her Iron patches and get the Iron IV, Per this note she should be doing both, I did tell her she still needs to do the IV iron so she is calling infusion.

## 2025-06-18 DIAGNOSIS — D50.9 IRON DEFICIENCY ANEMIA, UNSPECIFIED IRON DEFICIENCY ANEMIA TYPE: ICD-10-CM

## 2025-06-18 DIAGNOSIS — E53.8 FOLIC ACID DEFICIENCY: ICD-10-CM

## 2025-06-23 RX ORDER — FOLIC ACID 1 MG/1
1000 TABLET ORAL
Qty: 30 TABLET | Refills: 6 | Status: SHIPPED | OUTPATIENT
Start: 2025-06-23

## 2025-07-14 ENCOUNTER — TELEPHONE (OUTPATIENT)
Dept: HEMATOLOGY/ONCOLOGY | Facility: CLINIC | Age: 70
End: 2025-07-14
Payer: MEDICARE

## 2025-07-31 ENCOUNTER — TELEPHONE (OUTPATIENT)
Dept: HEMATOLOGY/ONCOLOGY | Facility: CLINIC | Age: 70
End: 2025-07-31
Payer: MEDICARE

## 2025-07-31 NOTE — TELEPHONE ENCOUNTER
Called patient to confirm appointment for 8/4/25. Spoke with patient. Patient confirmed appointment.

## 2025-08-04 ENCOUNTER — TELEPHONE (OUTPATIENT)
Dept: HEMATOLOGY/ONCOLOGY | Facility: CLINIC | Age: 70
End: 2025-08-04
Payer: MEDICARE

## 2025-08-04 NOTE — PROGRESS NOTES
Audio Only Telehealth Visit     The patient location is: Home  The chief complaint leading to consultation is: IRON DEFICIENCY   Visit type: Virtual visit with audio only (telephone)  Total time spent in medical discussion with patient: 5 minutes  Total time spent on date of the encounter:25 minutes       The reason for the audio only service rather than synchronous audio and video virtual visit was related to technical difficulties or patient preference/necessity.       Each patient to whom I provide medical services by telemedicine is:  (1) informed of the relationship between the physician and patient and the respective role of any other health care provider with respect to management of the patient; and (2) notified that they may decline to receive medical services by telemedicine and may withdraw from such care at any time. Patient verbally consented to receive this service via voice-only telephone call.     HPI:   67 y/o F w/ PMHx of HTN, HLD, anemia, GERD, depression referred to Hematology at [facility] for anemia    EGD 4/2/19 unremarkable. FOBT at same time positive  Colonoscopy 4/3/19 found nonthrombosed internal hemorrhoids, diverticulosis in sigmoid and descending colon but no clear source of blood loss.  Nuclear medicine bleeding scan showed equivocal focus of GI bleeding in mid small bowel loops in left lower quadrant  M2A capsule endoscopy showed a few small erosions in terminal ileum but otherwise no gross blood loss in whole small bowel.          Interval History:  8/5/25  Patient from home due to lack of transportation. She has been dealing with her sister's health and hospitalization due to a brain bleed. She has noted SOB, swelling in her feet but has not yet seen her cardiologist.  Her labs from 8/1 showed Hgb 10.8 and MCV 70.6 with Ferritin 61, Folic acid 9.5, % sat 18, Iron 57 . She was not able to get last 2 doses of iron due to cost.     5/29/2025:  Patient here for follow up.  Since her last  visit she has been admitted to the hospital with chest pain.  She has noticed worsening fatigue and shortness of breath.  She is having some financial issues and some home issues with her son and grandson and is scared of being evicted.  She has had some suicidal thoughts but no plan and is speaking with her insurance about seeking therapy.  She states that she has a good question and would not ever hurt herself intentionally but due to financial issues sometimes wonders if her life would be better if she was not alive.     1/28/2025:  Patient tolerated Venofer x5 doses in November and had marked improvement in energy.  Unfortunately she had a subsequent fall with fracture of her hip and back.  She continues on folic acid and no oral iron.  Her labs from January 23rd 2025 show hemoglobin 11.4 MCV 70 platelets 328 iron 71 ferritin 86 folic acid 36.8 vitamin B12 339 and% sat 24     10/24/24:   She had labs done on October 15, 2024 that showed hemoglobin 11.1 creatinine 1.57 platelets 331 ferritin 25 iron 67% sat 19.  She states that she has not been compliant with her oral iron due to continued constipation.  Currently she is working with Cardiology and Nephrology in terms of managing her blood pressure.  She has continued fatigue.  She denies any bright red blood per rectum or dark stools.     LABS:  8/1/25  Hgb 10.8 and MCV 70.6 with Ferritin 61, Folic acid 9.5, % sat 18, Iron 57     05/22/2025 % sat 16 iron 56 white blood cell count 5700 hemoglobin 10.9 MCV 72.4 platelets 328 ferritin 29 folic acid greater than 20     01/23/2025  % sat 24 iron 71 ferritin 86 white blood cell count 5850 hemoglobin 11.4 MCV 70 platelets 328  RADIOLOGY:  None  PATHOLOGY  None  Assessment and plan:   1. Iron-deficiency  2. Hemoglobinopathy, hemoglobin H with alpha Thal trait  3. Folic acid deficiency        Plan   8/5/25  Continue folic acid  Monitor iron levels closely  F/u in 8-10 weeks with labs     05/29/2025  Continue folic  acid  Venofer 200 mg IV x5 doses  Long discussion about her depression and I do not believe patient we will harm herself.  She is actively seeking help and does not have any plan of action for suicide  We will call her in 1-2 weeks  Follow up in 8 weeks with iron studies       1/28/25  Continue folic acid daily  No oral iron due to constipation  Follow up in 10-12 weeks with iron studies and folic acid levels     I reviewed labs in detail with patient and she has continued iron-deficiency.  Her anemia is likely contributed by alpha thal and chronic kidney disease and I do not believe it will be better than 12 at any point.  In the future for creatinine worsens or other chronic conditions make her iron less than 10 she may be a candidate for Procrit.  At this time due to her severe constipation from iron pills and inconsistency with taking it due to symptoms I recommend she receive Venofer 200mg x 6 doses.  We will recheck her iron levels 1 week after infusion and then again in 3 months.  She can follow up at that time.  Risks/benefits/alternatives of infusion were discussed with patient in detail                        This service was not originating from a related E/M service provided within the previous 7 days nor will  to an E/M service or procedure within the next 24 hours or my soonest available appointment.  Prevailing standard of care was able to be met in this audio-only visit.

## 2025-08-05 ENCOUNTER — OFFICE VISIT (OUTPATIENT)
Dept: HEMATOLOGY/ONCOLOGY | Facility: CLINIC | Age: 70
End: 2025-08-05
Payer: MEDICARE

## 2025-08-05 VITALS — BODY MASS INDEX: 43.53 KG/M2 | WEIGHT: 238 LBS

## 2025-08-05 DIAGNOSIS — E53.8 FOLIC ACID DEFICIENCY: ICD-10-CM

## 2025-08-05 DIAGNOSIS — D50.9 IRON DEFICIENCY ANEMIA, UNSPECIFIED IRON DEFICIENCY ANEMIA TYPE: Primary | ICD-10-CM

## 2025-08-05 RX ORDER — AMLODIPINE BESYLATE 10 MG/1
10 TABLET ORAL EVERY MORNING
COMMUNITY

## 2025-08-05 RX ORDER — ONDANSETRON 4 MG/1
4 TABLET, ORALLY DISINTEGRATING ORAL EVERY 8 HOURS PRN
COMMUNITY
Start: 2025-06-09